# Patient Record
Sex: FEMALE | Race: WHITE | Employment: FULL TIME | URBAN - METROPOLITAN AREA
[De-identification: names, ages, dates, MRNs, and addresses within clinical notes are randomized per-mention and may not be internally consistent; named-entity substitution may affect disease eponyms.]

---

## 2018-01-31 PROBLEM — E66.813 OBESITY, CLASS III, BMI 40-49.9 (MORBID OBESITY): Status: ACTIVE | Noted: 2018-01-31

## 2024-04-22 RX ORDER — PROGESTERONE 200 MG/1
CAPSULE ORAL
Qty: 90 CAPSULE | Refills: 3 | OUTPATIENT
Start: 2024-04-22

## 2024-06-11 ENCOUNTER — OFFICE VISIT (OUTPATIENT)
Dept: GYNECOLOGY | Age: 49
End: 2024-06-11
Payer: COMMERCIAL

## 2024-06-11 VITALS
WEIGHT: 265.88 LBS | OXYGEN SATURATION: 96 % | RESPIRATION RATE: 17 BRPM | BODY MASS INDEX: 45.39 KG/M2 | HEART RATE: 107 BPM | HEIGHT: 64 IN | DIASTOLIC BLOOD PRESSURE: 84 MMHG | SYSTOLIC BLOOD PRESSURE: 140 MMHG

## 2024-06-11 DIAGNOSIS — Z98.84 HX OF BARIATRIC SURGERY: ICD-10-CM

## 2024-06-11 DIAGNOSIS — F33.0 MAJOR DEPRESSIVE DISORDER, RECURRENT, MILD (HCC): ICD-10-CM

## 2024-06-11 DIAGNOSIS — F33.1 MAJOR DEPRESSIVE DISORDER, RECURRENT, MODERATE (HCC): ICD-10-CM

## 2024-06-11 DIAGNOSIS — Z01.419 WELL WOMAN EXAM WITH ROUTINE GYNECOLOGICAL EXAM: Primary | ICD-10-CM

## 2024-06-11 PROCEDURE — 99396 PREV VISIT EST AGE 40-64: CPT | Performed by: OBSTETRICS & GYNECOLOGY

## 2024-06-11 RX ORDER — PROGESTERONE 200 MG/1
200 CAPSULE ORAL DAILY
Qty: 90 CAPSULE | Refills: 3 | Status: SHIPPED | OUTPATIENT
Start: 2024-06-11

## 2024-06-12 ENCOUNTER — TELEPHONE (OUTPATIENT)
Age: 49
End: 2024-06-12

## 2024-06-12 DIAGNOSIS — R90.89 ABNORMAL MRI, SPINAL CORD: Primary | ICD-10-CM

## 2024-06-17 ASSESSMENT — ENCOUNTER SYMPTOMS
RESPIRATORY NEGATIVE: 1
GASTROINTESTINAL NEGATIVE: 1
ALLERGIC/IMMUNOLOGIC NEGATIVE: 1
EYES NEGATIVE: 1

## 2024-06-18 ENCOUNTER — HOSPITAL ENCOUNTER (OUTPATIENT)
Dept: MRI IMAGING | Age: 49
Discharge: HOME | End: 2024-06-18
Attending: PSYCHIATRY & NEUROLOGY
Payer: COMMERCIAL

## 2024-06-18 DIAGNOSIS — G37.9 DEMYELINATING DISEASE (HCC): ICD-10-CM

## 2024-06-18 PROCEDURE — 70551 MRI BRAIN STEM W/O DYE: CPT

## 2024-06-18 NOTE — PROGRESS NOTES
Subjective   Patient ID: Gómez Andrews is a 48 y.o. female.    Patient is here for annual. Patient in perimenopause.     Gynecologic Exam        Review of Systems   Constitutional: Negative.    HENT: Negative.     Eyes: Negative.    Respiratory: Negative.     Cardiovascular: Negative.    Gastrointestinal: Negative.    Endocrine: Negative.    Genitourinary: Negative.    Musculoskeletal: Negative.    Skin: Negative.    Allergic/Immunologic: Negative.    Neurological: Negative.    Hematological: Negative.    Psychiatric/Behavioral: Negative.       Date of Birth 1975  Past Medical History:   Diagnosis Date    Anesthesia     felt like she was unable to breathe after surgery at age 16- please keep O2 on in recovery     Arthritis     Bicuspid cardiac valve     slight leak- OK per echocardiogram     Depression     Dysplasia of cervix     Fatty liver 2024    Gastroesophageal reflux disease 2018    Hearing aid worn     HPV (human papilloma virus) anogenital infection     HTN (hypertension)     Hyperlipidemia     Obesity, Class III, BMI 40-49.9 (morbid obesity) (HCC) 2018    LM (obstructive sleep apnea) 2018    cpap    Type 2 diabetes mellitus 2024     Past Surgical History:   Procedure Laterality Date    BREAST BIOPSY Left x3    benign lumpectomies      SECTION      CHOLECYSTECTOMY      DILATION AND CURETTAGE OF UTERUS      ENDOMETRIAL ABLATION      HERNIA REPAIR N/A 2021    ROBOTIC RECURRENT INCARCERATED INCISIONAL HERNIA REPAIR, WITH MESH, ROBOTIC LYSIS OF ADHESIONS performed by Gavino Rojas DO at [facility] OR    HIATAL HERNIA REPAIR N/A 2020    LAPAROSCOPIC INCARCERATED INCISIONAL HERNIA REPAIR performed by Gavino Rojas DO at [facility] OR    HYSTERECTOMY, TOTAL ABDOMINAL (CERVIX REMOVED) N/A 2017    still has ovaries per patient    LEEP  , 3/2017    SINUS SURGERY      SLEEVE GASTRECTOMY N/A 2020    ROBOTIC SLEEVE GASTRECTOMY performed by Gavino Rojas DO at [facility]

## 2024-06-19 ENCOUNTER — PATIENT MESSAGE (OUTPATIENT)
Dept: NEUROLOGY | Age: 49
End: 2024-06-19

## 2024-06-19 NOTE — TELEPHONE ENCOUNTER
From: Gómez Andrews  To: Dr. Shiva Kovacs  Sent: 6/19/2024 7:24 AM EDT  Subject: Mri    I had MRI yesterday. Of course results not in yet. But I am feeling horrible. I have started FMLA process. I took off yesterday feeling horrible, and today is no better. I was going to wait to ask until results but I don't think I can. It's like since heat hit I have zero energy. Can you please sign for me to go on leave.

## 2024-06-20 ENCOUNTER — PATIENT MESSAGE (OUTPATIENT)
Dept: FAMILY MEDICINE CLINIC | Age: 49
End: 2024-06-20

## 2024-06-20 ENCOUNTER — HOSPITAL ENCOUNTER (OUTPATIENT)
Dept: GENERAL RADIOLOGY | Age: 49
Discharge: HOME | End: 2024-06-20
Payer: COMMERCIAL

## 2024-06-20 ENCOUNTER — OFFICE VISIT (OUTPATIENT)
Dept: FAMILY MEDICINE CLINIC | Age: 49
End: 2024-06-20
Payer: COMMERCIAL

## 2024-06-20 ENCOUNTER — HOSPITAL ENCOUNTER (OUTPATIENT)
Age: 49
Discharge: HOME | End: 2024-06-20
Payer: COMMERCIAL

## 2024-06-20 VITALS
TEMPERATURE: 98.1 F | DIASTOLIC BLOOD PRESSURE: 84 MMHG | HEART RATE: 92 BPM | WEIGHT: 266.2 LBS | BODY MASS INDEX: 45.45 KG/M2 | OXYGEN SATURATION: 96 % | HEIGHT: 64 IN | SYSTOLIC BLOOD PRESSURE: 136 MMHG

## 2024-06-20 DIAGNOSIS — R53.83 FATIGUE, UNSPECIFIED TYPE: ICD-10-CM

## 2024-06-20 DIAGNOSIS — R06.02 SHORTNESS OF BREATH: ICD-10-CM

## 2024-06-20 DIAGNOSIS — Z87.891 PERSONAL HISTORY OF SMOKING: ICD-10-CM

## 2024-06-20 DIAGNOSIS — R06.02 SHORTNESS OF BREATH: Primary | ICD-10-CM

## 2024-06-20 DIAGNOSIS — H92.01 RIGHT EAR PAIN: Primary | ICD-10-CM

## 2024-06-20 PROCEDURE — 71046 X-RAY EXAM CHEST 2 VIEWS: CPT

## 2024-06-20 PROCEDURE — 99214 OFFICE O/P EST MOD 30 MIN: CPT | Performed by: NURSE PRACTITIONER

## 2024-06-20 RX ORDER — FLUTICASONE PROPIONATE 50 MCG
1 SPRAY, SUSPENSION (ML) NASAL DAILY
Qty: 48 G | Refills: 0 | Status: SHIPPED | OUTPATIENT
Start: 2024-06-20

## 2024-06-20 RX ORDER — FLUTICASONE PROPIONATE 50 MCG
1 SPRAY, SUSPENSION (ML) NASAL DAILY
Qty: 32 G | Refills: 1 | Status: SHIPPED | OUTPATIENT
Start: 2024-06-20 | End: 2024-06-20

## 2024-06-20 ASSESSMENT — ENCOUNTER SYMPTOMS
RESPIRATORY NEGATIVE: 1
SHORTNESS OF BREATH: 0
COUGH: 0
GASTROINTESTINAL NEGATIVE: 1
WHEEZING: 0

## 2024-06-20 NOTE — TELEPHONE ENCOUNTER
From: SUMIT ALFARO  To: Gómez Sewell  Sent: 6/20/2024 2:12 PM EDT  Subject: Results      Chest xray is normal  Written by IVETH Sequeira CNP

## 2024-06-20 NOTE — PROGRESS NOTES
Patient: Gómez Sewell is a 48 y.o. female who presents today with the following Chief Complaint(s):  Chief Complaint   Patient presents with    Otalgia     Right ear pain- started Wednesday     Shortness of Breath       Assessment:  Encounter Diagnoses   Name Primary?    Right ear pain Yes    Shortness of breath     Fatigue, unspecified type        Plan:  1. Right ear pain  Likely eustachian tube dysfunction, treat with flonase and follow up if no improvement.     2. Shortness of breath  Obtain chest Xray and follow up if no improvement. Advised patient to use her inhaler as needed and to cut back on smoking.   - XR CHEST STANDARD (2 VW); Future    3. Fatigue, unspecified type  Likely related to possible MS diagnosis. Will fill out intermittent FMLA.       HPI  Patient presents today with concerns of right ear pain. It started yesterday. She states the shortness of breath started today. She is reporting headaches as well.   She is being worked up for possible MS. She is following with neurology. She had a brain MRI on Tuesday of this week and is waiting to hear back from neurology on results.   She has been feeling very fatigued and low energy recently along with the numbness increasing in her feet.   She is requesting intermittent FMLA leave due to her ongoing concerns. After she has MRI results she will follow up with neurology to determine if further FMLA is needed.she will need intermittent for appointments and flare ups.       Current Outpatient Medications   Medication Sig Dispense Refill    fluticasone (FLONASE) 50 MCG/ACT nasal spray 1 spray by Each Nostril route daily 32 g 1    progesterone (PROMETRIUM) 200 MG CAPS capsule Take 1 capsule by mouth daily 90 capsule 3    traZODone (DESYREL) 50 MG tablet Take 1 tablet by mouth nightly 90 tablet 1    Semaglutide,0.25 or 0.5MG/DOS, (OZEMPIC, 0.25 OR 0.5 MG/DOSE,) 2 MG/3ML SOPN Inject 0.5 mg into the skin once a week 3 mL 1    hydroCHLOROthiazide 12.5 MG

## 2024-06-24 NOTE — TELEPHONE ENCOUNTER
MRI resulted.  Routing to provider for next steps. Mentioned symptoms in pt's mychart messages in my routing comments to provider.     Pt does not have f/u sched at this time.

## 2024-06-24 NOTE — TELEPHONE ENCOUNTER
Per Dr. Aguiar - pt shouldn't need another MRI until end of Sept & she was advised of this via [employer].  Pt stated she would like to move forward with Lumbar puncture.  Routing to Dr. Aguiar to enter orders.

## 2024-06-25 NOTE — TELEPHONE ENCOUNTER
Pt informed via [employer] message that Dr. Aguiar has entered orders for LP.  I sent her prep info in that message as well.  Will watch to see when she gets it scheduled.

## 2024-06-26 ENCOUNTER — TELEPHONE (OUTPATIENT)
Dept: INTERVENTIONAL RADIOLOGY/VASCULAR | Age: 49
End: 2024-06-26

## 2024-06-26 NOTE — TELEPHONE ENCOUNTER
Called and spoke to Gómez about upcoming procedure. Phone number used: [phone]  Procedure:  lumbar puncture  Approving Radiologist:     Pt informed of the following:  Date of procedure: 7/1/24  Arrival time of procedure: 1030  Time of procedure: 1200      Need SDS: Yes

## 2024-07-01 ENCOUNTER — HOSPITAL ENCOUNTER (OUTPATIENT)
Dept: INTERVENTIONAL RADIOLOGY/VASCULAR | Age: 49
Discharge: HOME | End: 2024-07-01
Attending: PSYCHIATRY & NEUROLOGY
Payer: COMMERCIAL

## 2024-07-01 VITALS
HEART RATE: 71 BPM | RESPIRATION RATE: 16 BRPM | TEMPERATURE: 98 F | OXYGEN SATURATION: 98 % | SYSTOLIC BLOOD PRESSURE: 130 MMHG | DIASTOLIC BLOOD PRESSURE: 67 MMHG

## 2024-07-01 DIAGNOSIS — R90.89 ABNORMAL MRI, SPINAL CORD: ICD-10-CM

## 2024-07-01 LAB
APPEARANCE CSF: CLEAR
CLOT EVALUATION CSF: NORMAL
COLOR CSF: COLORLESS
DEPRECATED RDW RBC AUTO: 14 % (ref 12.4–15.4)
GLUCOSE BLD-MCNC: 131 MG/DL (ref 70–99)
GLUCOSE CSF-MCNC: 85 MG/DL (ref 40–80)
HCT VFR BLD AUTO: 42.5 % (ref 36–48)
HGB BLD-MCNC: 14.1 G/DL (ref 12–16)
INR PPP: 0.99 (ref 0.85–1.15)
MANUAL DIF COMMENT CSF-IMP: NORMAL
MCH RBC QN AUTO: 32.1 PG (ref 26–34)
MCHC RBC AUTO-ENTMCNC: 33.2 G/DL (ref 31–36)
MCV RBC AUTO: 96.8 FL (ref 80–100)
MENING+ENC PNL CSF NAA+NON-PROBE: NORMAL
NUC CELL # FLD MANUAL: 1 /CUMM (ref 0–5)
PERFORMED ON: ABNORMAL
PLATELET # BLD AUTO: 251 K/UL (ref 135–450)
PMV BLD AUTO: 7.3 FL (ref 5–10.5)
PROT CSF-MCNC: 39 MG/DL (ref 15–45)
PROTHROMBIN TIME: 13.3 SEC (ref 11.9–14.9)
RBC # BLD AUTO: 4.39 M/UL (ref 4–5.2)
RBC # FLD MANUAL: 0 /CUMM
REPORT: NORMAL
TUBE # CSF: NORMAL
WBC # BLD AUTO: 13.9 K/UL (ref 4–11)

## 2024-07-01 PROCEDURE — 2500000003 HC RX 250 WO HCPCS: Performed by: RADIOLOGY

## 2024-07-01 PROCEDURE — 82164 ANGIOTENSIN I ENZYME TEST: CPT

## 2024-07-01 PROCEDURE — 7100000010 HC PHASE II RECOVERY - FIRST 15 MIN: Performed by: RADIOLOGY

## 2024-07-01 PROCEDURE — 85610 PROTHROMBIN TIME: CPT

## 2024-07-01 PROCEDURE — 7100000011 HC PHASE II RECOVERY - ADDTL 15 MIN: Performed by: RADIOLOGY

## 2024-07-01 PROCEDURE — 62328 DX LMBR SPI PNXR W/FLUOR/CT: CPT

## 2024-07-01 PROCEDURE — 85027 COMPLETE CBC AUTOMATED: CPT

## 2024-07-01 PROCEDURE — 89050 BODY FLUID CELL COUNT: CPT

## 2024-07-01 PROCEDURE — 82945 GLUCOSE OTHER FLUID: CPT

## 2024-07-01 PROCEDURE — 87483 CNS DNA AMP PROBE TYPE 12-25: CPT

## 2024-07-01 PROCEDURE — 36415 COLL VENOUS BLD VENIPUNCTURE: CPT

## 2024-07-01 PROCEDURE — 84157 ASSAY OF PROTEIN OTHER: CPT

## 2024-07-01 RX ORDER — LIDOCAINE HYDROCHLORIDE 10 MG/ML
INJECTION, SOLUTION EPIDURAL; INFILTRATION; INTRACAUDAL; PERINEURAL PRN
Status: COMPLETED | OUTPATIENT
Start: 2024-07-01 | End: 2024-07-01

## 2024-07-01 RX ADMIN — LIDOCAINE HYDROCHLORIDE 3 ML: 10 INJECTION, SOLUTION EPIDURAL; INFILTRATION; INTRACAUDAL; PERINEURAL at 12:23

## 2024-07-01 ASSESSMENT — PAIN - FUNCTIONAL ASSESSMENT: PAIN_FUNCTIONAL_ASSESSMENT: NONE - DENIES PAIN

## 2024-07-01 ASSESSMENT — PAIN SCALES - GENERAL: PAINLEVEL_OUTOF10: 6

## 2024-07-01 NOTE — OR NURSING
Pt arrived for image guided lumbar puncture by Lara. Procedure explained including the risk and benefits of the procedure. All questions answered. Pt verbalizes understanding of the procedure and states no more questions. Consent confirmed. Vital signs stable. Labs, allergies, medications, and code status reviewed. No contraindications noted. Patient was placed prone on the IR table. Time out completed prior to procedure start.     Vital Signs  Vitals:    07/01/24 1213   BP: 126/62   Pulse: 73   Resp: 16   Temp:    SpO2: 100%    (vital signs in table format)      Allergies  Biaxin [clarithromycin], Diflucan [fluconazole], Dilaudid [hydromorphone hcl], Erythromycin, Eszopiclone, Hydromorphone, Terbinafine and related, Jardiance [empagliflozin], and Metformin and related (allergies)    Labs  Lab Results   Component Value Date    INR 0.99 07/01/2024    PROTIME 13.3 07/01/2024     Lab Results   Component Value Date    CREATININE 0.6 03/11/2024    BUN 14 03/11/2024     (L) 03/11/2024    K 4.0 03/11/2024    CL 99 03/11/2024    CO2 26 03/11/2024     Lab Results   Component Value Date    WBC 13.9 (H) 07/01/2024    HGB 14.1 07/01/2024    HCT 42.5 07/01/2024    MCV 96.8 07/01/2024     07/01/2024

## 2024-07-01 NOTE — OR NURSING
Image guided lumbar puncture completed. Specimen sent to lab for culture. Pt tolerated procedure without any signs or symptoms of distress. Vital signs stable. Patient to lat flat with HOB raised 15 degrees and bedrest for 2 hours. Patient has a bandage to lower back that is clean, dry and intact. Report called to RN . Pt transported back to [facility] in stable condition via bed by transport.     14.5ml of CSF fluid collect.  Color of CSF fluid: clear    Vital Signs  Vitals:    07/01/24 1213   BP: 126/62   Pulse: 73   Resp: 16   Temp:    SpO2: 100%    (vital signs in table format)

## 2024-07-01 NOTE — PROGRESS NOTES
Pt up to the bathroom to void without difficulty. Patient has met discharge criteria per policy. Discharge instructions given to pt and family. Verbalized understanding. PIV removed. Pt dressed and wheeled out and discharged to the care of their family in stable condition.

## 2024-07-01 NOTE — PROGRESS NOTES
Patient admitted to [facility] bay 7. Consents verified. Patient NPO since 0825 this AM. Patient belongings to remain on PACU cart for procedure.

## 2024-07-03 LAB — ACE CSF-CCNC: 0.6 U/L (ref 0–2.5)

## 2024-07-05 ENCOUNTER — OFFICE VISIT (OUTPATIENT)
Dept: FAMILY MEDICINE CLINIC | Age: 49
End: 2024-07-05
Payer: COMMERCIAL

## 2024-07-05 VITALS
OXYGEN SATURATION: 97 % | BODY MASS INDEX: 45.21 KG/M2 | DIASTOLIC BLOOD PRESSURE: 66 MMHG | SYSTOLIC BLOOD PRESSURE: 134 MMHG | WEIGHT: 264.8 LBS | HEART RATE: 95 BPM | HEIGHT: 64 IN

## 2024-07-05 DIAGNOSIS — G62.9 NEUROPATHY: ICD-10-CM

## 2024-07-05 DIAGNOSIS — R35.0 URINE FREQUENCY: Primary | ICD-10-CM

## 2024-07-05 LAB
BILIRUBIN, POC: NORMAL
BLOOD URINE, POC: NORMAL
CLARITY, POC: NORMAL
COLOR, POC: YELLOW
GLUCOSE URINE, POC: NORMAL
KETONES, POC: NORMAL
LEUKOCYTE EST, POC: NORMAL
NITRITE, POC: NORMAL
PH, POC: 6
PROTEIN, POC: 30
SPECIFIC GRAVITY, POC: 1.03
UROBILINOGEN, POC: 0.2

## 2024-07-05 PROCEDURE — 81002 URINALYSIS NONAUTO W/O SCOPE: CPT | Performed by: NURSE PRACTITIONER

## 2024-07-05 PROCEDURE — 99214 OFFICE O/P EST MOD 30 MIN: CPT | Performed by: NURSE PRACTITIONER

## 2024-07-05 RX ORDER — DULOXETIN HYDROCHLORIDE 30 MG/1
30 CAPSULE, DELAYED RELEASE ORAL 2 TIMES DAILY
Qty: 60 CAPSULE | Refills: 3 | Status: SHIPPED | OUTPATIENT
Start: 2024-07-05

## 2024-07-05 RX ORDER — GABAPENTIN 100 MG/1
100 CAPSULE ORAL NIGHTLY
Qty: 30 CAPSULE | Refills: 1 | Status: SHIPPED | OUTPATIENT
Start: 2024-07-05 | End: 2024-09-03

## 2024-07-05 RX ORDER — SULFAMETHOXAZOLE AND TRIMETHOPRIM 800; 160 MG/1; MG/1
1 TABLET ORAL 2 TIMES DAILY
Qty: 10 TABLET | Refills: 0 | Status: SHIPPED | OUTPATIENT
Start: 2024-07-05 | End: 2024-07-10

## 2024-07-05 ASSESSMENT — ENCOUNTER SYMPTOMS
RESPIRATORY NEGATIVE: 1
GASTROINTESTINAL NEGATIVE: 1

## 2024-07-05 NOTE — PROGRESS NOTES
Patient: Gómez Sewell is a 48 y.o. female who presents today with the following Chief Complaint(s):  Chief Complaint   Patient presents with    Muscle Pain    Urinary Frequency     X 2 days       Assessment:  Encounter Diagnoses   Name Primary?    Urine frequency Yes    Neuropathy        Plan:  1. Urine frequency  Urine dip positive for trace blood.  Will start on Bactrim and send urine culture.  - POCT Urinalysis no Micro  - Culture, Urine  - sulfamethoxazole-trimethoprim (BACTRIM DS;SEPTRA DS) 800-160 MG per tablet; Take 1 tablet by mouth 2 times daily for 5 days  Dispense: 10 tablet; Refill: 0    2. Neuropathy  Will trial gabapentin nightly and patient will let me know if no improvement.  - gabapentin (NEURONTIN) 100 MG capsule; Take 1 capsule by mouth nightly for 60 days.  Dispense: 30 capsule; Refill: 1      HPI  Patient presents today with concerns of UTI.  She reports urinary frequency, muscle aches, increased fatigue and some urgency.  Urine dip today does show trace blood.  Patient states this feels like her past more recent UTIs.  She is also still having tingling burning sensation in her feet and she is concerned that she has not been able to find the reason or cause for this.  Her EMG was negative for any neuropathy however her symptoms seem consistent with neuropathy.  She has not tried gabapentin in the past but is willing to try it and see if it helps.  She is also going to follow-up with the back specialist.        Current Outpatient Medications   Medication Sig Dispense Refill    gabapentin (NEURONTIN) 100 MG capsule Take 1 capsule by mouth nightly for 60 days. 30 capsule 1    sulfamethoxazole-trimethoprim (BACTRIM DS;SEPTRA DS) 800-160 MG per tablet Take 1 tablet by mouth 2 times daily for 5 days 10 tablet 0    DULoxetine (CYMBALTA) 30 MG extended release capsule Take 1 capsule by mouth 2 times daily 60 capsule 3    fluticasone (FLONASE) 50 MCG/ACT nasal spray USE 1 SPRAY IN EACH NOSTRIL DAILY

## 2024-07-05 NOTE — TELEPHONE ENCOUNTER
Per Dr. Aguiar: We have not seen any significant abnormalities to confirm any neurological problem.  I will suggest her to F/U in UC for the second opinion.    [employer] message sent to pt with Dr. Aguiar's recommendation. However, I'm not sure  Neuro is currently accepting patients from non-UC referring providers.  Pt has UMR so she could possibly seek 2nd opinion through [facility] if she's interested in going that route.

## 2024-07-05 NOTE — TELEPHONE ENCOUNTER
Oligoclonal banding is still pending but her MRI brain is normal.  This is likely to be negative.

## 2024-07-07 LAB — BACTERIA UR CULT: NORMAL

## 2024-07-08 ENCOUNTER — OFFICE VISIT (OUTPATIENT)
Dept: ORTHOPEDIC SURGERY | Age: 49
End: 2024-07-08
Payer: COMMERCIAL

## 2024-07-08 VITALS — HEIGHT: 64 IN | WEIGHT: 264 LBS | BODY MASS INDEX: 45.07 KG/M2

## 2024-07-08 DIAGNOSIS — G89.29 CHRONIC RIGHT-SIDED THORACIC BACK PAIN: ICD-10-CM

## 2024-07-08 DIAGNOSIS — R20.2 NUMBNESS AND TINGLING OF BOTH FEET: Primary | ICD-10-CM

## 2024-07-08 DIAGNOSIS — M54.6 CHRONIC RIGHT-SIDED THORACIC BACK PAIN: ICD-10-CM

## 2024-07-08 DIAGNOSIS — M51.34 DDD (DEGENERATIVE DISC DISEASE), THORACIC: ICD-10-CM

## 2024-07-08 DIAGNOSIS — R20.0 NUMBNESS AND TINGLING OF BOTH FEET: Primary | ICD-10-CM

## 2024-07-08 DIAGNOSIS — M50.20 PROTRUSION OF CERVICAL INTERVERTEBRAL DISC: ICD-10-CM

## 2024-07-08 PROCEDURE — 99214 OFFICE O/P EST MOD 30 MIN: CPT | Performed by: PHYSICIAN ASSISTANT

## 2024-07-08 NOTE — PROGRESS NOTES
FOLLOW UP: SPINE    7/8/2024     CHIEF COMPLAINT:    Chief Complaint   Patient presents with    Follow-up     MRI RESULTS CERVICAL & THORACIC     NEUROLOGY CONSULT WITH DR. AGUIAR 6/12/2024       HISTORY OF PRESENT ILLNESS:              The patient is a 48 y.o. female history of GERD, LM, here to review spine MRIs for chronic back pain with LE paresthesias.  Today she states her right thoracic back pain is most bothersome. She reports a 1 year history of left > right foot numbness affecting all digits and now extending into the calves in a stocking distribution.  She also reports underlying chronic low back pain which is typically aggravated with prolonged standing.  Spine MRIs were ordered to assess for cord abnormality.  Prior EMG was normal.  Conservative care includes: Tylenol, prednisone, Cymbalta, physical therapy (improved NP 90%).  At times she does report some subjective foot weakness which is episodic.  She denies any progressive extremity weakness.  She denies any fine motor difficulty.  At time she does report some gait instability.  Currently denies any clear-cut upper or lower extremity radiating symptoms.  She denies any recent injury or direct trauma.  She denies current fevers chills or night sweats.    She does report a history of chronic headaches and previous meningitis    While I was out she did discuss cervical MRI results with Dr. Sosa--there was a questionable cord lesion at C3-4; however, reread favored artifact.  She is actively seeing Dr. Aguiar (neurology) and will be undergoing a repeat C MRI in September. LP was normal.     PCP recently started her on gabapentin 100 mg at nighttime with good benefit and no significant side effects    The pain assessment was noted & reviewed in the medical record today.     Current/Past Treatment:   Physical Therapy: YES for C spine   Chiropractic:     Injection:     Medications:            NSAIDS: History of gastric sleeve             Muscle

## 2024-07-16 RX ORDER — SEMAGLUTIDE 0.68 MG/ML
INJECTION, SOLUTION SUBCUTANEOUS
Qty: 6 ML | Refills: 3 | Status: SHIPPED | OUTPATIENT
Start: 2024-07-16

## 2024-07-16 NOTE — TELEPHONE ENCOUNTER
Last Office Visit  -  7/5/24  Next Office Visit  -  8/2/24    Last Filled  -  4/18/24  Last UDS -    Contract -

## 2024-07-17 ENCOUNTER — HOSPITAL ENCOUNTER (OUTPATIENT)
Dept: PHYSICAL THERAPY | Age: 49
Setting detail: THERAPIES SERIES
Discharge: HOME | End: 2024-07-17
Payer: COMMERCIAL

## 2024-07-17 DIAGNOSIS — R53.1 GENERALIZED WEAKNESS: ICD-10-CM

## 2024-07-17 DIAGNOSIS — M54.50 LUMBAR PAIN: Primary | ICD-10-CM

## 2024-07-17 PROCEDURE — 97110 THERAPEUTIC EXERCISES: CPT

## 2024-07-17 PROCEDURE — 97161 PT EVAL LOW COMPLEX 20 MIN: CPT

## 2024-07-17 NOTE — PLAN OF CARE
activities related to strengthening, flexibility, endurance, ROM performed to prevent loss of range of motion, maintain or improve muscular strength or increase flexibility, following either an injury or surgery.   [x] (94599) NEUROMUSCULAR RE-EDUCATION - Therapeutic procedure, 1 or more areas, each 15 minutes; neuromuscular reeducation of movement, balance, coordination, kinesthetic sense, posture, and/or proprioception for sitting and/or standing activities  [x] (96446) MANUAL THERAPY -  Manual therapy techniques, 1 or more regions, each 15 minutes (Mobilization/manipulation, manual lymphatic drainage, manual traction) for the purpose of modulating pain, promoting relaxation,  increasing ROM, reducing/eliminating soft tissue swelling/inflammation/restriction, improving soft tissue extensibility and allowing for proper ROM for normal function with self care, mobility, lifting and ambulation  [] (79355) Needle insertion(s) without injection; 1 or 2 muscle(s).  [] (20846) Needle insertion(s) without injection; 3 or more muscle(s)  [] (03827) ATTENDED ESTIM. Application of a modality to 1 or more areas; electrical stimulation (manual), each 15 minutes. Attended electrical stimulation requires direct (1-on-1) contact with the patient by the qualified professional/qualified personnel in providing electrical stimulation manually through the use of probes or other devices.        GOALS     Patient stated goal: Decreased symptoms  [] Progressing: [] Met: [] Not Met: [] Adjusted    Therapist goals for Patient:   Short Term Goals: To be achieved in: 2 weeks  1. Independent in HEP and progression per patient tolerance, in order to prevent re-injury.   [] Progressing: [] Met: [] Not Met: [] Adjusted  2. Patient will have a decrease in pain to <1/10 to facilitate improvement in movement, function, and ADLs as indicated by Functional Deficits.  [] Progressing: [] Met: [] Not Met: [] Adjusted    Long Term Goals: To be achieved

## 2024-07-25 ENCOUNTER — HOSPITAL ENCOUNTER (OUTPATIENT)
Dept: PHYSICAL THERAPY | Age: 49
Setting detail: THERAPIES SERIES
End: 2024-07-25
Payer: COMMERCIAL

## 2024-08-02 ENCOUNTER — OFFICE VISIT (OUTPATIENT)
Dept: FAMILY MEDICINE CLINIC | Age: 49
End: 2024-08-02
Payer: COMMERCIAL

## 2024-08-02 VITALS
DIASTOLIC BLOOD PRESSURE: 84 MMHG | BODY MASS INDEX: 45.65 KG/M2 | HEIGHT: 64 IN | WEIGHT: 267.4 LBS | OXYGEN SATURATION: 97 % | HEART RATE: 90 BPM | SYSTOLIC BLOOD PRESSURE: 136 MMHG

## 2024-08-02 DIAGNOSIS — E11.40 TYPE 2 DIABETES MELLITUS WITH DIABETIC NEUROPATHY, WITHOUT LONG-TERM CURRENT USE OF INSULIN (HCC): Primary | ICD-10-CM

## 2024-08-02 DIAGNOSIS — G62.9 NEUROPATHY: ICD-10-CM

## 2024-08-02 LAB — HBA1C MFR BLD: 6.2 %

## 2024-08-02 PROCEDURE — 99214 OFFICE O/P EST MOD 30 MIN: CPT | Performed by: NURSE PRACTITIONER

## 2024-08-02 PROCEDURE — 83036 HEMOGLOBIN GLYCOSYLATED A1C: CPT | Performed by: NURSE PRACTITIONER

## 2024-08-02 PROCEDURE — 3044F HG A1C LEVEL LT 7.0%: CPT | Performed by: NURSE PRACTITIONER

## 2024-08-02 RX ORDER — GABAPENTIN 100 MG/1
100 CAPSULE ORAL 2 TIMES DAILY
Qty: 60 CAPSULE | Refills: 3 | Status: SHIPPED | OUTPATIENT
Start: 2024-08-02 | End: 2024-11-30

## 2024-08-02 ASSESSMENT — ENCOUNTER SYMPTOMS
GASTROINTESTINAL NEGATIVE: 1
RESPIRATORY NEGATIVE: 1

## 2024-08-02 NOTE — PROGRESS NOTES
Patient: Gómez Sewell is a 48 y.o. female who presents today with the following Chief Complaint(s):  Chief Complaint   Patient presents with    3 Month Follow-Up     Discuss increasing Gabapentin        Assessment:  Encounter Diagnoses   Name Primary?    Type 2 diabetes mellitus with diabetic neuropathy, without long-term current use of insulin (HCC) Yes    Neuropathy        Plan:  1. Type 2 diabetes mellitus with diabetic neuropathy, without long-term current use of insulin (HCC)  A1c down to 6.2.  Continue current medications. F/u in 3 months  - POCT glycosylated hemoglobin (Hb A1C)    2. Neuropathy  Doing better with gabapentin we will increase to twice daily and follow-up in 3 months.  - gabapentin (NEURONTIN) 100 MG capsule; Take 1 capsule by mouth in the morning and at bedtime for 120 days.  Dispense: 60 capsule; Refill: 3      HPI  Patient presents today to follow-up on type 2 diabetes and neuropathy.  She reports the gabapentin is helping but would like to try and increase it to twice a day.  She states it does not take the sensation completely away but it does help.  A1c today is down to 6.2.  She is tolerating the Ozempic well.    Current Outpatient Medications   Medication Sig Dispense Refill    gabapentin (NEURONTIN) 100 MG capsule Take 1 capsule by mouth in the morning and at bedtime for 120 days. 60 capsule 3    DULoxetine (CYMBALTA) 30 MG extended release capsule Take 1 capsule by mouth 2 times daily 60 capsule 3    fluticasone (FLONASE) 50 MCG/ACT nasal spray USE 1 SPRAY IN EACH NOSTRIL DAILY 48 g 0    progesterone (PROMETRIUM) 200 MG CAPS capsule Take 1 capsule by mouth daily 90 capsule 3    traZODone (DESYREL) 50 MG tablet Take 1 tablet by mouth nightly 90 tablet 1    Semaglutide,0.25 or 0.5MG/DOS, (OZEMPIC, 0.25 OR 0.5 MG/DOSE,) 2 MG/3ML SOPN Inject 0.5 mg into the skin once a week 3 mL 1    hydroCHLOROthiazide 12.5 MG capsule Take 1 capsule by mouth every morning 90 capsule 1    Blood Pressure

## 2024-08-07 ENCOUNTER — OFFICE VISIT (OUTPATIENT)
Dept: ORTHOPEDIC SURGERY | Age: 49
End: 2024-08-07
Payer: COMMERCIAL

## 2024-08-07 VITALS — BODY MASS INDEX: 45.58 KG/M2 | WEIGHT: 267 LBS | HEIGHT: 64 IN

## 2024-08-07 DIAGNOSIS — M54.42 CHRONIC BILATERAL LOW BACK PAIN WITH LEFT-SIDED SCIATICA: ICD-10-CM

## 2024-08-07 DIAGNOSIS — G89.29 CHRONIC BILATERAL LOW BACK PAIN WITH LEFT-SIDED SCIATICA: ICD-10-CM

## 2024-08-07 DIAGNOSIS — R20.0 NUMBNESS AND TINGLING OF BOTH FEET: Primary | ICD-10-CM

## 2024-08-07 DIAGNOSIS — R20.2 NUMBNESS AND TINGLING OF BOTH FEET: Primary | ICD-10-CM

## 2024-08-07 DIAGNOSIS — M50.20 PROTRUSION OF CERVICAL INTERVERTEBRAL DISC: ICD-10-CM

## 2024-08-07 DIAGNOSIS — M51.34 DDD (DEGENERATIVE DISC DISEASE), THORACIC: ICD-10-CM

## 2024-08-07 PROCEDURE — 99214 OFFICE O/P EST MOD 30 MIN: CPT | Performed by: PHYSICIAN ASSISTANT

## 2024-08-07 NOTE — PROGRESS NOTES
FOLLOW UP: SPINE    8/7/2024     CHIEF COMPLAINT:    Chief Complaint   Patient presents with    Follow-up     CERVICAL, THORACIC, AND LUMBAR SPINE        HISTORY OF PRESENT ILLNESS:              The patient is a 48 y.o. female history of GERD, LM, here to follow-up after physical therapy and pharmacologic care for chronic spine pain with LE paresthesias.  Today she states her cervicothoracic back pain is most bothersome--extends to left scapular region. She also reports a 1 year history of left > right foot numbness affecting all digits and now extending into the calves in a stocking distribution--pending updated EMG.  She also reports underlying chronic low back pain which is typically aggravated with prolonged standing.  Spine MRIs were ordered to assess for cord abnormality--questionable cord lesion C3-4, neurology following and pending updated cervical MRI.  Prior EMG was normal.  Conservative care includes: Physical therapy, gabapentin 100 mg twice daily with PCP, Tylenol, prednisone, Cymbalta.  At times she does report some subjective foot weakness which is episodic.  She reports improvement of her lower extremity symptoms taking gabapentin.  She also feels that PT has been helpful.  She denies any progressive extremity weakness.  She denies any fine motor difficulty.  At time she does report some gait instability.  Currently denies any clear-cut upper extremity radiating symptoms.  She denies any recent injury or direct trauma.  She denies current fevers chills or night sweats.    She does report a history of chronic headaches and previous meningitis    Questionable cord lesion at C3-4; however, reread favored artifact.  She is actively seeing Dr. Aguiar (neurology) and will be undergoing a repeat C MRI in September. LP was normal.     The pain assessment was noted & reviewed in the medical record today.     Current/Past Treatment:   Physical Therapy: YES for spine, helpful   Chiropractic:     Injection:

## 2024-08-08 ENCOUNTER — TELEMEDICINE (OUTPATIENT)
Dept: FAMILY MEDICINE CLINIC | Age: 49
End: 2024-08-08
Payer: COMMERCIAL

## 2024-08-08 DIAGNOSIS — G43.909 MIGRAINE WITHOUT STATUS MIGRAINOSUS, NOT INTRACTABLE, UNSPECIFIED MIGRAINE TYPE: Primary | ICD-10-CM

## 2024-08-08 PROCEDURE — 99213 OFFICE O/P EST LOW 20 MIN: CPT | Performed by: NURSE PRACTITIONER

## 2024-08-08 RX ORDER — SUMATRIPTAN 50 MG/1
50 TABLET, FILM COATED ORAL DAILY PRN
Qty: 9 TABLET | Refills: 3 | Status: SHIPPED | OUTPATIENT
Start: 2024-08-08

## 2024-08-08 RX ORDER — SUMATRIPTAN 50 MG/1
50 TABLET, FILM COATED ORAL DAILY PRN
Qty: 9 TABLET | Refills: 3 | Status: SHIPPED | OUTPATIENT
Start: 2024-08-08 | End: 2024-08-08

## 2024-08-08 RX ORDER — METHYLPREDNISOLONE 4 MG/1
TABLET ORAL
Qty: 21 TABLET | Refills: 0 | Status: SHIPPED | OUTPATIENT
Start: 2024-08-08

## 2024-08-08 RX ORDER — METHYLPREDNISOLONE 4 MG/1
TABLET ORAL
Qty: 1 KIT | Refills: 0 | Status: SHIPPED | OUTPATIENT
Start: 2024-08-08

## 2024-08-08 ASSESSMENT — ENCOUNTER SYMPTOMS
COUGH: 0
GASTROINTESTINAL NEGATIVE: 1
SHORTNESS OF BREATH: 0
WHEEZING: 0
RESPIRATORY NEGATIVE: 1

## 2024-08-08 NOTE — PROGRESS NOTES
Gómez Sewell (:  1975) is a Established patient, presenting virtually for evaluation of the following:    Assessment & Plan   Below is the assessment and plan developed based on review of pertinent history, physical exam, labs, studies, and medications.  1. Migraine without status migrainosus, not intractable, unspecified migraine type  Advised patient to treat with imitrex and if not effective then can try the steroid pack. Follow up if no improvement.   -     methylPREDNISolone (MEDROL, HOLDEN,) 4 MG tablet; Take 6 tablets all at once on day 1, 5 tablets on day 2, 4 tablets on day 3, 3 tablets on day 4 and 2 tablets on day 5 and 1 tablet on day 6., Disp-21 tablet, R-0Normal  -     SUMAtriptan (IMITREX) 50 MG tablet; Take 1 tablet by mouth daily as needed for Migraine May repeat dose in 2 hours if needed. Do not exceed more than 2 doses in 24 hours, Disp-9 tablet, R-3Normal           Subjective   HPI  Patient presents today with concerns of a migraine. She states it is mostly on the left side and her left ear feels like it is ringing. She states she used to have chronic cluster headaches. She states she has not had those in years and this feels a little different. She does report some nausea but denies any vision changes. She has tried Tylenol but is unable to take Ibuprofen due to history of gastric sleeve.   She reports in the past a steroid pack has work well with treating her headaches.       Review of Systems   Constitutional: Negative.    HENT: Negative.     Respiratory: Negative.  Negative for cough, shortness of breath and wheezing.    Cardiovascular: Negative.    Gastrointestinal: Negative.    Musculoskeletal: Negative.    Skin: Negative.    Neurological:  Positive for headaches. Negative for dizziness.   Psychiatric/Behavioral: Negative.            Objective   Patient-Reported Vitals  Patient-Reported Weight: 267 lbs  Patient-Reported Height: 5'3\"       Physical Exam  [INSTRUCTIONS:  \"[x]\"

## 2024-08-12 ENCOUNTER — PATIENT MESSAGE (OUTPATIENT)
Dept: NEUROLOGY | Age: 49
End: 2024-08-12

## 2024-08-12 ENCOUNTER — HOSPITAL ENCOUNTER (EMERGENCY)
Age: 49
Discharge: HOME | End: 2024-08-12
Payer: COMMERCIAL

## 2024-08-12 VITALS
HEIGHT: 63 IN | SYSTOLIC BLOOD PRESSURE: 148 MMHG | OXYGEN SATURATION: 98 % | RESPIRATION RATE: 16 BRPM | TEMPERATURE: 99.1 F | BODY MASS INDEX: 47.31 KG/M2 | WEIGHT: 267 LBS | HEART RATE: 82 BPM | DIASTOLIC BLOOD PRESSURE: 81 MMHG

## 2024-08-12 DIAGNOSIS — G43.809 OTHER MIGRAINE WITHOUT STATUS MIGRAINOSUS, NOT INTRACTABLE: Primary | ICD-10-CM

## 2024-08-12 PROCEDURE — 96374 THER/PROPH/DIAG INJ IV PUSH: CPT

## 2024-08-12 PROCEDURE — 2580000003 HC RX 258: Performed by: NURSE PRACTITIONER

## 2024-08-12 PROCEDURE — 6360000002 HC RX W HCPCS: Performed by: NURSE PRACTITIONER

## 2024-08-12 PROCEDURE — 6370000000 HC RX 637 (ALT 250 FOR IP): Performed by: NURSE PRACTITIONER

## 2024-08-12 PROCEDURE — 96375 TX/PRO/DX INJ NEW DRUG ADDON: CPT

## 2024-08-12 PROCEDURE — 99284 EMERGENCY DEPT VISIT MOD MDM: CPT

## 2024-08-12 RX ORDER — METOCLOPRAMIDE HYDROCHLORIDE 5 MG/ML
10 INJECTION INTRAMUSCULAR; INTRAVENOUS ONCE
Status: COMPLETED | OUTPATIENT
Start: 2024-08-12 | End: 2024-08-12

## 2024-08-12 RX ORDER — KETOROLAC TROMETHAMINE 30 MG/ML
30 INJECTION, SOLUTION INTRAMUSCULAR; INTRAVENOUS ONCE
Status: COMPLETED | OUTPATIENT
Start: 2024-08-12 | End: 2024-08-12

## 2024-08-12 RX ORDER — DIPHENHYDRAMINE HYDROCHLORIDE 50 MG/ML
25 INJECTION INTRAMUSCULAR; INTRAVENOUS ONCE
Status: COMPLETED | OUTPATIENT
Start: 2024-08-12 | End: 2024-08-12

## 2024-08-12 RX ORDER — 0.9 % SODIUM CHLORIDE 0.9 %
1000 INTRAVENOUS SOLUTION INTRAVENOUS ONCE
Status: COMPLETED | OUTPATIENT
Start: 2024-08-12 | End: 2024-08-12

## 2024-08-12 RX ORDER — BUTALBITAL, ACETAMINOPHEN AND CAFFEINE 50; 325; 40 MG/1; MG/1; MG/1
2 TABLET ORAL ONCE
Status: COMPLETED | OUTPATIENT
Start: 2024-08-12 | End: 2024-08-12

## 2024-08-12 RX ADMIN — SODIUM CHLORIDE 1000 ML: 9 INJECTION, SOLUTION INTRAVENOUS at 09:12

## 2024-08-12 RX ADMIN — METOCLOPRAMIDE HYDROCHLORIDE 10 MG: 5 INJECTION INTRAMUSCULAR; INTRAVENOUS at 09:13

## 2024-08-12 RX ADMIN — DIPHENHYDRAMINE HYDROCHLORIDE 25 MG: 50 INJECTION INTRAMUSCULAR; INTRAVENOUS at 09:13

## 2024-08-12 RX ADMIN — KETOROLAC TROMETHAMINE 30 MG: 30 INJECTION, SOLUTION INTRAMUSCULAR at 09:14

## 2024-08-12 RX ADMIN — BUTALBITAL, ACETAMINOPHEN AND CAFFEINE 2 TABLET: 325; 50; 40 TABLET ORAL at 09:15

## 2024-08-12 ASSESSMENT — PAIN DESCRIPTION - LOCATION
LOCATION: HEAD
LOCATION: HEAD

## 2024-08-12 ASSESSMENT — PAIN - FUNCTIONAL ASSESSMENT: PAIN_FUNCTIONAL_ASSESSMENT: 0-10

## 2024-08-12 ASSESSMENT — PAIN SCALES - GENERAL
PAINLEVEL_OUTOF10: 0
PAINLEVEL_OUTOF10: 5
PAINLEVEL_OUTOF10: 7

## 2024-08-12 NOTE — ED NOTES
Patient reports that her headache pain is a 0/10 and is improved from previous with medications.  Patient repots she would like to go home and \"go to bed\". Patient resting calm in bed. NP made aware.

## 2024-08-13 ENCOUNTER — TELEPHONE (OUTPATIENT)
Dept: ORTHOPEDIC SURGERY | Age: 49
End: 2024-08-13

## 2024-08-13 ENCOUNTER — CARE COORDINATION (OUTPATIENT)
Dept: OTHER | Facility: CLINIC | Age: 49
End: 2024-08-13

## 2024-08-13 ENCOUNTER — PATIENT MESSAGE (OUTPATIENT)
Dept: ORTHOPEDIC SURGERY | Age: 49
End: 2024-08-13

## 2024-08-13 ENCOUNTER — TELEPHONE (OUTPATIENT)
Age: 49
End: 2024-08-13

## 2024-08-13 DIAGNOSIS — R20.2 NUMBNESS AND TINGLING OF BOTH FEET: Primary | ICD-10-CM

## 2024-08-13 DIAGNOSIS — M54.2 NECK PAIN: Primary | ICD-10-CM

## 2024-08-13 DIAGNOSIS — M50.20 PROTRUSION OF CERVICAL INTERVERTEBRAL DISC: ICD-10-CM

## 2024-08-13 DIAGNOSIS — G95.9 MYELOPATHY (HCC): ICD-10-CM

## 2024-08-13 DIAGNOSIS — R20.2 NUMBNESS AND TINGLING OF BOTH FEET: ICD-10-CM

## 2024-08-13 DIAGNOSIS — M54.2 NECK PAIN: ICD-10-CM

## 2024-08-13 DIAGNOSIS — R20.0 NUMBNESS AND TINGLING OF BOTH FEET: Primary | ICD-10-CM

## 2024-08-13 DIAGNOSIS — R20.0 NUMBNESS AND TINGLING OF BOTH FEET: ICD-10-CM

## 2024-08-13 NOTE — TELEPHONE ENCOUNTER
[facility] called regarding patient headaches. Pt states she has tried reach out to office via [employer] multiple times without success. I informed office I would reach out to patient directly to address her concerns. Patient was seen in ED for headache/migraines by end of visit states her pain level was a 0. States by the time she left the ER she was felt better for about 4 hours but medication did wear off.   Patient is currently scheduled for MRI cervical spine on 8/15 ordered by ortho. Dr Aguiar did advise patient in June to seek a 2nd opinion through  or [facility] if she did not agree with plan of care.   Spoke with patient whom scheduled for fua 8/14/24 to discuss her headaches

## 2024-08-13 NOTE — CARE COORDINATION
Ambulatory Care Coordination Note     2024 12:07 PM     Patient Current Location:  [place]     This patient was received as a referral from Population [facility] .    ACM contacted the patient by telephone. Verified name and  with patient as identifiers. Provided introduction to self, and explanation of the ACM role.   Patient accepted care management services at this time.          ACM: Giovanna Lopez RN     Challenges to be reviewed by the provider   Additional needs identified to be addressed with provider No  none               Method of communication with provider: none.    Care Summary Note: Patient states she was in the ED 24. States she has had a headache for 5 days and she could not get any relief from headache at home. States she did improve while at the ED. States around 11:30 her headache started to come back. Denies nausea currently but states she did have nausea for a few days prior to when her headache started. States she is taking tylenol and gets a few hours of relief but then headache comes right back. States she does not take ibuprofen because she has had a gastric sleeve. States she is staying hydrated. Caffeine does seem to help with headache. States Sona back and body seems to help also. States headache seems to be worse when standing up or coughing. Denies n/v/d. States she does have sensitivity to sound. She is working from home.   Patient is established with neurology, orthopedics, PCP. States she has a history of cluster headaches about 25 years ago. States her blood pressure is good and she is taking her blood pressure medication as directed. Patient states she also has numbness and tingling in bilateral feet and this goes up the side of her calves to her knees. States she was prescribed gabapentin and this does seem to be helping. She is also on oral steroids and her last dose is today. Patient states she is diabetic. Last A1C was 6.3. states she is on ozempic and no

## 2024-08-13 NOTE — TELEPHONE ENCOUNTER
Spoke with the patient who states she is having severe headaches at this time. Patient was informed that per Leisa Oquendo, PAC \"Can order cervical MRI W&WO compare to prior. Would also recommend discussing migraine with neurology.\"    Patient states she has been trying to get ahold of neurology, she states they are not returning her messages. I informed the patient the MRI C-Spine order will be placed, will call once approved. She was also informed I will attempt to reach out to Neurology and let them know about her headaches. She voiced understanding. I will reach out to patient once I speak with Neurology. She voiced understanding.

## 2024-08-13 NOTE — ED PROVIDER NOTES
Physical Exam  GENERAL APPEARANCE: Awake and alert. Cooperative. No acute distress.  HEAD: Normocephalic. Atraumatic.  EYES: PERRL. EOM's grossly intact. No scleral injection or icterus.  ENT: Mucous membranes are moist.   NECK: Supple. No tracheal deviation.  No nuchal rigidity  HEART: RRR.   LUNGS: Respirations unlabored. CTAB. Good air exchange. Speaking comfortably in full sentences.   ABDOMEN: Soft. Non-distended. Non-tender. No guarding or rebound. Normal bowel sounds.  EXTREMITIES: No peripheral edema. Moves all extremities equally. All extremities neurovascularly intact.   SKIN: Warm and dry. No acute rashes.   NEUROLOGICAL: Alert and oriented. No gross facial drooping. Strength 5/5, sensation intact. Normal coordination. Gait is steady.  Cranial nerves II through XII are grossly intact  PSYCHIATRIC: Normal mood and affect.      DIAGNOSTIC RESULTS   LABS:    Labs Reviewed - No data to display    When ordered only abnormal lab results are displayed. All other labs were within normal range or not returned as of this dictation.    EKG: When ordered, EKG's are interpreted by the Emergency Department Physician in the absence of a cardiologist.  Please see their note for interpretation of EKG.    RADIOLOGY:   Non-plain film images such as CT, Ultrasound and MRI are read by the radiologist. Plain radiographic images are visualized and preliminarily interpreted by the ED Provider with the below findings:        Interpretation per the Radiologist below, if available at the time of this note:    No orders to display     No results found.    No results found.    PROCEDURES   Unless otherwise noted below, none     Procedures    CRITICAL CARE TIME (.cctime)       PAST MEDICAL HISTORY      has a past medical history of Anesthesia, Arthritis, Bicuspid cardiac valve, Depression, Dysplasia of cervix, Fatty liver (03/11/2024), Gastroesophageal reflux disease (01/07/2018), Hearing aid worn, HPV (human papilloma

## 2024-08-13 NOTE — TELEPHONE ENCOUNTER
Attempted to call pt to discuss options. Can order cervical MRI W&WO compare to prior. Would also recommend discussing migraine with neurology. ED if any new or progressive symptoms. Left VM.     Leisa Oquendo PA-C

## 2024-08-13 NOTE — TELEPHONE ENCOUNTER
It is worth noting that pt sent 4 [facility] messages on Monday 8/13 between the hours of 3:50 a.m. and 8:06 a.m. - office opens at 8:00.  She informed us via [facility] at 8:06 that she was going to the ER.

## 2024-08-14 ENCOUNTER — OFFICE VISIT (OUTPATIENT)
Age: 49
End: 2024-08-14

## 2024-08-14 VITALS
HEART RATE: 87 BPM | OXYGEN SATURATION: 97 % | DIASTOLIC BLOOD PRESSURE: 82 MMHG | BODY MASS INDEX: 47.3 KG/M2 | SYSTOLIC BLOOD PRESSURE: 136 MMHG | HEIGHT: 63 IN | RESPIRATION RATE: 12 BRPM

## 2024-08-14 DIAGNOSIS — M54.81 OCCIPITAL NEURALGIA OF LEFT SIDE: ICD-10-CM

## 2024-08-14 DIAGNOSIS — R51.9 ACUTE INTRACTABLE HEADACHE, UNSPECIFIED HEADACHE TYPE: Primary | ICD-10-CM

## 2024-08-14 RX ORDER — DEXAMETHASONE SODIUM PHOSPHATE 10 MG/ML
10 INJECTION INTRAMUSCULAR; INTRAVENOUS ONCE
Status: COMPLETED | OUTPATIENT
Start: 2024-08-14 | End: 2024-08-14

## 2024-08-14 RX ORDER — LIDOCAINE HYDROCHLORIDE 20 MG/ML
5 INJECTION, SOLUTION INFILTRATION; PERINEURAL ONCE
Status: COMPLETED | OUTPATIENT
Start: 2024-08-14 | End: 2024-08-14

## 2024-08-14 RX ADMIN — LIDOCAINE HYDROCHLORIDE 5 ML: 20 INJECTION, SOLUTION INFILTRATION; PERINEURAL at 16:19

## 2024-08-14 RX ADMIN — DEXAMETHASONE SODIUM PHOSPHATE 10 MG: 10 INJECTION INTRAMUSCULAR; INTRAVENOUS at 16:18

## 2024-08-14 NOTE — PATIENT INSTRUCTIONS

## 2024-08-14 NOTE — PROGRESS NOTES
Occipital nerve block    Indication:    Left occipital neuralgia    Technique:    Identify landmarks and cahyo the injection site as the diagram below  Move hair from area (e.g. with assistant or lubricating jelly can be applied)  Clean the area (e.g. hibiclens, betadine, or Alcohol)    Preparation:    Syringe: 5 cc  Needle: 27 gauge 1.25\"  1% Lidocaine 3 cc  Dexamethasone 10 mg or 1 cc    Injection:    Insert the needle from inferior approach  Angle approximately 30-45 degrees and insert until striking periosteum  Aspirate for blood and if found, withdraw and redirect needle (to prevent intravascular injection)  Inject a total of 2-3 cc of medication at site, distributing in a fan-shaped distribution, NIVIA technique         
cpap    Type 2 diabetes mellitus 2024       Past surgical history:    Past Surgical History:   Procedure Laterality Date    BREAST BIOPSY Left x3    benign lumpectomies      SECTION      CHOLECYSTECTOMY      DILATION AND CURETTAGE OF UTERUS      ENDOMETRIAL ABLATION      HERNIA REPAIR N/A 2021    ROBOTIC RECURRENT INCARCERATED INCISIONAL HERNIA REPAIR, WITH MESH, ROBOTIC LYSIS OF ADHESIONS performed by Gavino Rojas DO at [facility] OR    HIATAL HERNIA REPAIR N/A 2020    LAPAROSCOPIC INCARCERATED INCISIONAL HERNIA REPAIR performed by Gavino Rojas DO at [facility] OR    HYSTERECTOMY, TOTAL ABDOMINAL (CERVIX REMOVED) N/A 2017    still has ovaries per patient    LEEP  , 3/2017    SINUS SURGERY      SLEEVE GASTRECTOMY N/A 2020    ROBOTIC SLEEVE GASTRECTOMY performed by Gavino Rojas DO at [facility] OR    TUBAL LIGATION      TYMPANOSTOMY TUBE PLACEMENT  x2    UPPER GASTROINTESTINAL ENDOSCOPY N/A 2020    EGD BIOPSY performed by Gavnio Rojas DO at [facility] ENDOSCOPY        Medication:    Current Outpatient Medications   Medication Sig Dispense Refill    gabapentin (NEURONTIN) 100 MG capsule Take 1 capsule by mouth in the morning and at bedtime for 120 days. 60 capsule 3    DULoxetine (CYMBALTA) 30 MG extended release capsule Take 1 capsule by mouth 2 times daily 60 capsule 3    progesterone (PROMETRIUM) 200 MG CAPS capsule Take 1 capsule by mouth daily 90 capsule 3    traZODone (DESYREL) 50 MG tablet Take 1 tablet by mouth nightly 90 tablet 1    Semaglutide,0.25 or 0.5MG/DOS, (OZEMPIC, 0.25 OR 0.5 MG/DOSE,) 2 MG/3ML SOPN Inject 0.5 mg into the skin once a week 3 mL 1    hydroCHLOROthiazide 12.5 MG capsule Take 1 capsule by mouth every morning 90 capsule 1    Blood Pressure Monitoring (COMFORT TOUCH BP CUFF/LARGE) MISC To check BP daily 1 each 0    omeprazole (PRILOSEC) 40 MG delayed release capsule Take 1 capsule by mouth daily 90 capsule 3    atorvastatin (LIPITOR) 40 MG tablet Take 1 tablet by mouth

## 2024-08-15 ENCOUNTER — HOSPITAL ENCOUNTER (OUTPATIENT)
Dept: MRI IMAGING | Age: 49
Discharge: HOME | End: 2024-08-15
Payer: COMMERCIAL

## 2024-08-15 DIAGNOSIS — R20.2 NUMBNESS AND TINGLING OF BOTH FEET: ICD-10-CM

## 2024-08-15 DIAGNOSIS — M54.2 NECK PAIN: ICD-10-CM

## 2024-08-15 DIAGNOSIS — R20.0 NUMBNESS AND TINGLING OF BOTH FEET: ICD-10-CM

## 2024-08-15 DIAGNOSIS — M50.20 PROTRUSION OF CERVICAL INTERVERTEBRAL DISC: ICD-10-CM

## 2024-08-15 DIAGNOSIS — G95.9 MYELOPATHY (HCC): ICD-10-CM

## 2024-08-15 LAB
PERFORMED ON: NORMAL
POC CREATININE: 0.6 MG/DL (ref 0.6–1.1)
POC SAMPLE TYPE: NORMAL

## 2024-08-15 PROCEDURE — A9579 GAD-BASE MR CONTRAST NOS,1ML: HCPCS | Performed by: PHYSICIAN ASSISTANT

## 2024-08-15 PROCEDURE — 82565 ASSAY OF CREATININE: CPT

## 2024-08-15 PROCEDURE — 72156 MRI NECK SPINE W/O & W/DYE: CPT

## 2024-08-15 PROCEDURE — 6360000004 HC RX CONTRAST MEDICATION: Performed by: PHYSICIAN ASSISTANT

## 2024-08-15 RX ADMIN — GADOTERIDOL 20 ML: 279.3 INJECTION, SOLUTION INTRAVENOUS at 08:49

## 2024-08-29 ENCOUNTER — CARE COORDINATION (OUTPATIENT)
Dept: OTHER | Facility: CLINIC | Age: 49
End: 2024-08-29

## 2024-08-29 NOTE — CARE COORDINATION
Ambulatory Care Coordination Note     8/29/2024 1:48 PM     Patient outreach attempt by this AC today to perform care management follow up . [facility] was unable to reach the patient by telephone today; left voice message requesting a return phone call to this ACM.     ACM: Giovanna Lopez RN    PCP/Specialist follow up:   Future Appointments         Provider Specialty Dept Phone    8/30/2024 8:00 AM Rach Pritchard APRN - CNP Family Medicine [phone]    11/8/2024 4:20 PM Rach Pritchard APRN - CNP Family Medicine [phone]    6/12/2025 4:40 PM Yamileth Gardner MD Gynecology [phone]            Follow Up:   Plan for next AC outreach in approximately 1 week to complete:  - outreach attempt to offer care management services.

## 2024-08-30 ENCOUNTER — OFFICE VISIT (OUTPATIENT)
Dept: FAMILY MEDICINE CLINIC | Age: 49
End: 2024-08-30
Payer: COMMERCIAL

## 2024-08-30 VITALS
HEART RATE: 91 BPM | WEIGHT: 268 LBS | BODY MASS INDEX: 47.48 KG/M2 | DIASTOLIC BLOOD PRESSURE: 78 MMHG | SYSTOLIC BLOOD PRESSURE: 128 MMHG | HEIGHT: 63 IN | OXYGEN SATURATION: 98 %

## 2024-08-30 DIAGNOSIS — S90.424A BLISTER OF TOE OF RIGHT FOOT, INITIAL ENCOUNTER: Primary | ICD-10-CM

## 2024-08-30 PROCEDURE — 99213 OFFICE O/P EST LOW 20 MIN: CPT | Performed by: NURSE PRACTITIONER

## 2024-08-30 RX ORDER — MUPIROCIN 20 MG/G
OINTMENT TOPICAL
Qty: 15 G | Refills: 1 | Status: SHIPPED | OUTPATIENT
Start: 2024-08-30 | End: 2024-09-06

## 2024-08-30 ASSESSMENT — ENCOUNTER SYMPTOMS
GASTROINTESTINAL NEGATIVE: 1
RESPIRATORY NEGATIVE: 1

## 2024-08-30 NOTE — PROGRESS NOTES
Patient: Gómez Sewell is a 48 y.o. female who presents today with the following Chief Complaint(s):  Chief Complaint   Patient presents with    Toe Pain     Raw spot in between 4th and 5th toe right foot. Her feet are numb, but toe is hurting       Assessment:  Encounter Diagnosis   Name Primary?    Blister of toe of right foot, initial encounter Yes       Plan:  1. Blister of toe of right foot, initial encounter  Treat with bactroban ointment and follow up if no improvement or worsening symptoms.   - mupirocin (BACTROBAN) 2 % ointment; Apply topically 3 times daily.  Dispense: 15 g; Refill: 1      HPI  Patient presents today with concerns of soreness in between her 4th and 5th toe on the right. She states it feels raw. She does have numbness on her feet. She noticed the area 2-3 days ago. She is a diabetic.     Current Outpatient Medications   Medication Sig Dispense Refill    mupirocin (BACTROBAN) 2 % ointment Apply topically 3 times daily. 15 g 1    gabapentin (NEURONTIN) 100 MG capsule Take 1 capsule by mouth in the morning and at bedtime for 120 days. 60 capsule 3    DULoxetine (CYMBALTA) 30 MG extended release capsule Take 1 capsule by mouth 2 times daily 60 capsule 3    progesterone (PROMETRIUM) 200 MG CAPS capsule Take 1 capsule by mouth daily 90 capsule 3    traZODone (DESYREL) 50 MG tablet Take 1 tablet by mouth nightly 90 tablet 1    Semaglutide,0.25 or 0.5MG/DOS, (OZEMPIC, 0.25 OR 0.5 MG/DOSE,) 2 MG/3ML SOPN Inject 0.5 mg into the skin once a week 3 mL 1    hydroCHLOROthiazide 12.5 MG capsule Take 1 capsule by mouth every morning 90 capsule 1    Blood Pressure Monitoring (COMFORT TOUCH BP CUFF/LARGE) MISC To check BP daily 1 each 0    omeprazole (PRILOSEC) 40 MG delayed release capsule Take 1 capsule by mouth daily 90 capsule 3    atorvastatin (LIPITOR) 40 MG tablet Take 1 tablet by mouth daily 90 tablet 3    lisinopril (PRINIVIL;ZESTRIL) 20 MG tablet Take 1 tablet by mouth daily 90 tablet 1    blood

## 2024-09-09 ENCOUNTER — TELEPHONE (OUTPATIENT)
Dept: ORTHOPEDIC SURGERY | Age: 49
End: 2024-09-09

## 2024-09-12 ENCOUNTER — CARE COORDINATION (OUTPATIENT)
Dept: OTHER | Facility: CLINIC | Age: 49
End: 2024-09-12

## 2024-09-30 DIAGNOSIS — R20.2 NUMBNESS AND TINGLING OF BOTH FEET: Primary | ICD-10-CM

## 2024-09-30 DIAGNOSIS — R20.0 NUMBNESS AND TINGLING OF BOTH FEET: Primary | ICD-10-CM

## 2024-10-01 ENCOUNTER — CARE COORDINATION (OUTPATIENT)
Dept: OTHER | Facility: CLINIC | Age: 49
End: 2024-10-01

## 2024-10-01 NOTE — CARE COORDINATION
Ambulatory Care Coordination Note     10/1/2024 2:33 PM     patient outreach attempt by this ACM today to perform care management follow up . ACM was unable to reach the patient by telephone today; left voice message requesting a return phone call to this ACM.  [employer] message sent requesting patient to contact this ACM.     Patient closed (unable to reach patient) from the High Risk Care Management program on 10/1/2024.  Care management goals have been completed. No further Ambulatory Care Manager follow up scheduled.

## 2024-10-07 ENCOUNTER — TELEPHONE (OUTPATIENT)
Dept: ORTHOPEDIC SURGERY | Age: 49
End: 2024-10-07

## 2024-10-07 NOTE — TELEPHONE ENCOUNTER
L/M for the patient informing the patient I was calling to let her know we had a same day cancellation at 2:20PM at our [place] office with Leisa Oquendo PA-C. She may call if wishing to proceed with same day add on or she may keep her appointment on Wednesday 10/9/24 at our [place] office. Patient was instructed to call back at her convenience.

## 2024-10-09 ENCOUNTER — OFFICE VISIT (OUTPATIENT)
Dept: ORTHOPEDIC SURGERY | Age: 49
End: 2024-10-09
Payer: COMMERCIAL

## 2024-10-09 VITALS — WEIGHT: 268 LBS | BODY MASS INDEX: 47.48 KG/M2 | HEIGHT: 63 IN

## 2024-10-09 DIAGNOSIS — R20.2 NUMBNESS AND TINGLING OF BOTH FEET: Primary | ICD-10-CM

## 2024-10-09 DIAGNOSIS — R20.0 NUMBNESS AND TINGLING OF BOTH FEET: Primary | ICD-10-CM

## 2024-10-09 DIAGNOSIS — M54.2 NECK PAIN: ICD-10-CM

## 2024-10-09 DIAGNOSIS — M50.20 PROTRUSION OF CERVICAL INTERVERTEBRAL DISC: ICD-10-CM

## 2024-10-09 PROCEDURE — 99214 OFFICE O/P EST MOD 30 MIN: CPT | Performed by: PHYSICIAN ASSISTANT

## 2024-10-09 NOTE — PROGRESS NOTES
Occipital nerve--Dr. 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    Past Medical History: Medical history form was reviewed today & scanned into the media tab  Past Medical History:   Diagnosis Date    Anesthesia     felt like she was unable to breathe after surgery at age 16- please keep O2 on in recovery     Arthritis     Bicuspid cardiac valve     slight leak- OK per echocardiogram     Depression     Dysplasia of cervix     Fatty liver 2024    Gastroesophageal reflux disease 2018    Hearing aid worn     HPV (human papilloma virus) anogenital infection     HTN (hypertension)     Hyperlipidemia     Obesity, Class III, BMI 40-49.9 (morbid obesity) 2018    LM (obstructive sleep apnea) 2018    cpap    Type 2 diabetes mellitus 2024      Past Surgical History:     Past Surgical History:   Procedure Laterality Date    BREAST BIOPSY Left x3    benign lumpectomies      SECTION      CHOLECYSTECTOMY      DILATION AND CURETTAGE OF UTERUS      ENDOMETRIAL ABLATION      HERNIA REPAIR N/A 2021    ROBOTIC RECURRENT INCARCERATED INCISIONAL HERNIA REPAIR, WITH MESH, ROBOTIC LYSIS OF ADHESIONS performed by Gavino Rojas DO at [facility] OR    HIATAL HERNIA REPAIR N/A 2020    LAPAROSCOPIC INCARCERATED INCISIONAL HERNIA REPAIR performed by Gavino Rojas DO at [facility] OR    HYSTERECTOMY, TOTAL ABDOMINAL (CERVIX REMOVED) N/A 2017    still has ovaries per patient    LEEP  2015, 3/2017    SINUS SURGERY      SLEEVE GASTRECTOMY N/A 2020    ROBOTIC SLEEVE GASTRECTOMY performed by Gavino Rojas DO at [facility] OR    TUBAL LIGATION      TYMPANOSTOMY TUBE PLACEMENT

## 2024-10-15 ENCOUNTER — PATIENT MESSAGE (OUTPATIENT)
Dept: FAMILY MEDICINE CLINIC | Age: 49
End: 2024-10-15

## 2024-10-15 DIAGNOSIS — E11.65 TYPE 2 DIABETES MELLITUS WITH HYPERGLYCEMIA, WITHOUT LONG-TERM CURRENT USE OF INSULIN (HCC): ICD-10-CM

## 2024-10-15 RX ORDER — SEMAGLUTIDE 0.68 MG/ML
0.5 INJECTION, SOLUTION SUBCUTANEOUS WEEKLY
Qty: 3 ML | Refills: 1 | Status: SHIPPED | OUTPATIENT
Start: 2024-10-15

## 2024-10-15 RX ORDER — SEMAGLUTIDE 0.68 MG/ML
0.5 INJECTION, SOLUTION SUBCUTANEOUS WEEKLY
Qty: 3 ML | Refills: 0 | Status: SHIPPED | COMMUNITY
Start: 2024-10-15

## 2024-10-15 RX ORDER — SEMAGLUTIDE 0.68 MG/ML
INJECTION, SOLUTION SUBCUTANEOUS
Qty: 6 ML | Refills: 0 | OUTPATIENT
Start: 2024-10-15

## 2024-10-15 NOTE — TELEPHONE ENCOUNTER
Last Office Visit  -  8/30/24  Next Office Visit  -  11/8/24    Last Filled  -  4/18/24  Last UDS -    Contract -  .    Please advise? Do have samples

## 2024-10-17 RX ORDER — LISINOPRIL 20 MG/1
20 TABLET ORAL DAILY
Qty: 90 TABLET | Refills: 1 | Status: SHIPPED | OUTPATIENT
Start: 2024-10-17

## 2024-10-17 NOTE — TELEPHONE ENCOUNTER
Last Office Visit  -  8/30/24  Next Office Visit  -  10/22/24    Last Filled  -  3/12/24  Last UDS -    Contract -

## 2024-10-18 ENCOUNTER — OFFICE VISIT (OUTPATIENT)
Dept: FAMILY MEDICINE CLINIC | Age: 49
End: 2024-10-18
Payer: COMMERCIAL

## 2024-10-18 VITALS
HEART RATE: 90 BPM | BODY MASS INDEX: 47.2 KG/M2 | OXYGEN SATURATION: 97 % | HEIGHT: 63 IN | DIASTOLIC BLOOD PRESSURE: 82 MMHG | SYSTOLIC BLOOD PRESSURE: 138 MMHG | WEIGHT: 266.4 LBS

## 2024-10-18 DIAGNOSIS — G62.9 NEUROPATHY: ICD-10-CM

## 2024-10-18 DIAGNOSIS — H66.91 RIGHT OTITIS MEDIA, UNSPECIFIED OTITIS MEDIA TYPE: Primary | ICD-10-CM

## 2024-10-18 PROCEDURE — 99213 OFFICE O/P EST LOW 20 MIN: CPT | Performed by: NURSE PRACTITIONER

## 2024-10-18 RX ORDER — GABAPENTIN 100 MG/1
CAPSULE ORAL
Qty: 30 CAPSULE | Refills: 3 | Status: SHIPPED | OUTPATIENT
Start: 2024-10-18 | End: 2024-11-18

## 2024-10-18 RX ORDER — AZITHROMYCIN 250 MG/1
TABLET, FILM COATED ORAL
Qty: 1 PACKET | Refills: 0 | Status: SHIPPED | OUTPATIENT
Start: 2024-10-18

## 2024-10-18 ASSESSMENT — ENCOUNTER SYMPTOMS
SHORTNESS OF BREATH: 0
WHEEZING: 0
GASTROINTESTINAL NEGATIVE: 1
RESPIRATORY NEGATIVE: 1
COUGH: 0

## 2024-10-18 NOTE — PROGRESS NOTES
Patient: Gómez Sewell is a 49 y.o. female who presents today with the following Chief Complaint(s):  Chief Complaint   Patient presents with    Otalgia     Right ear pain x 2 days    Other     Would like to discuss increasing gabapentin        Assessment:  Encounter Diagnoses   Name Primary?    Right otitis media, unspecified otitis media type Yes    Neuropathy        Plan:  1. Right otitis media, unspecified otitis media type  We will treat with Z-Leobardo and follow-up if no improvement.  - azithromycin (ZITHROMAX) 250 MG tablet; Take 2 tablets on day 1 and 1 tablet day 2-5  Dispense: 1 packet; Refill: 0    2. Neuropathy  Will increase gabapentin 1 in the morning 2 a night. Follow up if no improvement.   - gabapentin (NEURONTIN) 100 MG capsule; Take 1  capsule  in the AM  and 2 capsules at night  Dispense: 30 capsule; Refill: 3      HPI  Patient presents today with concerns of right ear pain.  She states her ear feels full and she feels like she is hearing an echo.  Symptoms started about 2 days ago.  She denies any other cough congestion.  She states her left ear feels fine.  She would also like to talk about increasing her gabapentin.  She would like to do 1 tablet in the morning and 2 at night.  She states that increasing the dose slowly has been helping smith off side effects and it is helping with her symptoms.        Current Outpatient Medications   Medication Sig Dispense Refill    gabapentin (NEURONTIN) 100 MG capsule Take 1  capsule  in the AM  and 2 capsules at night 30 capsule 3    azithromycin (ZITHROMAX) 250 MG tablet Take 2 tablets on day 1 and 1 tablet day 2-5 1 packet 0    lisinopril (PRINIVIL;ZESTRIL) 20 MG tablet TAKE ONE TABLET BY MOUTH ONCE A DAY 90 tablet 1    Semaglutide,0.25 or 0.5MG/DOS, (OZEMPIC, 0.25 OR 0.5 MG/DOSE,) 2 MG/3ML SOPN Inject 0.5 mg into the skin once a week 3 mL 1    Semaglutide,0.25 or 0.5MG/DOS, (OZEMPIC, 0.25 OR 0.5 MG/DOSE,) 2 MG/3ML SOPN Inject 0.5 mg into the skin once a

## 2024-11-07 DIAGNOSIS — G47.00 INSOMNIA, UNSPECIFIED TYPE: ICD-10-CM

## 2024-11-07 RX ORDER — TRAZODONE HYDROCHLORIDE 50 MG/1
50 TABLET, FILM COATED ORAL NIGHTLY
Qty: 30 TABLET | Refills: 5 | Status: SHIPPED | OUTPATIENT
Start: 2024-11-07

## 2024-11-07 NOTE — TELEPHONE ENCOUNTER
Last Office Visit  -  10/18/24  Next Office Visit  -  11/8/24    Last Filled  -  5/2/24  Last UDS -    Contract -

## 2024-11-08 ENCOUNTER — OFFICE VISIT (OUTPATIENT)
Dept: FAMILY MEDICINE CLINIC | Age: 49
End: 2024-11-08

## 2024-11-08 VITALS
SYSTOLIC BLOOD PRESSURE: 138 MMHG | BODY MASS INDEX: 46.56 KG/M2 | DIASTOLIC BLOOD PRESSURE: 70 MMHG | HEIGHT: 63 IN | OXYGEN SATURATION: 96 % | WEIGHT: 262.8 LBS | HEART RATE: 99 BPM

## 2024-11-08 DIAGNOSIS — E11.9 TYPE 2 DIABETES MELLITUS WITHOUT COMPLICATION, WITHOUT LONG-TERM CURRENT USE OF INSULIN (HCC): Primary | ICD-10-CM

## 2024-11-08 DIAGNOSIS — Z23 NEED FOR VACCINATION: ICD-10-CM

## 2024-11-08 ASSESSMENT — ENCOUNTER SYMPTOMS
GASTROINTESTINAL NEGATIVE: 1
RESPIRATORY NEGATIVE: 1

## 2024-11-08 NOTE — PROGRESS NOTES
Patient: Gómez Sewell is a 49 y.o. female who presents today with the following Chief Complaint(s):  Chief Complaint   Patient presents with    Diabetes       Assessment:  Encounter Diagnoses   Name Primary?    Type 2 diabetes mellitus without complication, without long-term current use of insulin (HCC) Yes    Need for vaccination        Plan:  1. Type 2 diabetes mellitus without complication, without long-term current use of insulin (HCC)  Overall patient has been stable.  Continue current medications and check fasting lipids.  Follow-up in 3 months.  - Lipid, Fasting; Future    2. Need for vaccination  - Influenza, FLUCELVAX Trivalent, (age 6 mo+) IM, Preservative Free, 0.5mL      HPI  Patient presents today for follow up on diabetes.  Patient states she is feeling well and reports her medications are working well.  She is taking her gabapentin 3 tablets all at once in the evening and she states that is working better for her.  She reports good compliance with her diabetic medications and her last A1c was 6.2 in August.  Will check blood work at next visit.    Current Outpatient Medications   Medication Sig Dispense Refill    traZODone (DESYREL) 50 MG tablet TAKE ONE TABLET BY MOUTH EVERY NIGHT 30 tablet 5    gabapentin (NEURONTIN) 100 MG capsule Take 1  capsule  in the AM  and 2 capsules at night 30 capsule 3    azithromycin (ZITHROMAX) 250 MG tablet Take 2 tablets on day 1 and 1 tablet day 2-5 1 packet 0    lisinopril (PRINIVIL;ZESTRIL) 20 MG tablet TAKE ONE TABLET BY MOUTH ONCE A DAY 90 tablet 1    Semaglutide,0.25 or 0.5MG/DOS, (OZEMPIC, 0.25 OR 0.5 MG/DOSE,) 2 MG/3ML SOPN Inject 0.5 mg into the skin once a week 3 mL 1    Semaglutide,0.25 or 0.5MG/DOS, (OZEMPIC, 0.25 OR 0.5 MG/DOSE,) 2 MG/3ML SOPN Inject 0.5 mg into the skin once a week 3 mL 0    methylPREDNISolone (MEDROL, HOLDEN,) 4 MG tablet Take 6 tablets all at once on day 1, 5 tablets on day 2, 4 tablets on day 3, 3 tablets on day 4 and 2 tablets on day

## 2024-11-16 ENCOUNTER — PATIENT MESSAGE (OUTPATIENT)
Dept: FAMILY MEDICINE CLINIC | Age: 49
End: 2024-11-16

## 2024-11-19 RX ORDER — DULOXETIN HYDROCHLORIDE 30 MG/1
30 CAPSULE, DELAYED RELEASE ORAL 2 TIMES DAILY
Qty: 60 CAPSULE | Refills: 3 | OUTPATIENT
Start: 2024-11-19

## 2024-11-19 RX ORDER — DULOXETIN HYDROCHLORIDE 30 MG/1
30 CAPSULE, DELAYED RELEASE ORAL 2 TIMES DAILY
Qty: 60 CAPSULE | Refills: 3 | Status: SHIPPED | OUTPATIENT
Start: 2024-11-19

## 2024-11-19 NOTE — TELEPHONE ENCOUNTER
Last Office Visit  -  11/8/24  Next Office Visit  -  n/a    Last Filled  -  7/5/24  Last UDS -    Contract -

## 2024-11-20 ENCOUNTER — OFFICE VISIT (OUTPATIENT)
Dept: ORTHOPEDIC SURGERY | Age: 49
End: 2024-11-20
Payer: COMMERCIAL

## 2024-11-20 ENCOUNTER — TELEPHONE (OUTPATIENT)
Dept: ORTHOPEDIC SURGERY | Age: 49
End: 2024-11-20

## 2024-11-20 VITALS — BODY MASS INDEX: 46.42 KG/M2 | WEIGHT: 262 LBS | HEIGHT: 63 IN

## 2024-11-20 DIAGNOSIS — R20.0 NUMBNESS AND TINGLING OF BOTH FEET: Primary | ICD-10-CM

## 2024-11-20 DIAGNOSIS — M51.26 PROTRUSION OF LUMBAR INTERVERTEBRAL DISC: ICD-10-CM

## 2024-11-20 DIAGNOSIS — G95.9 MYELOPATHY (HCC): ICD-10-CM

## 2024-11-20 DIAGNOSIS — R20.2 NUMBNESS AND TINGLING OF BOTH FEET: Primary | ICD-10-CM

## 2024-11-20 PROCEDURE — 99214 OFFICE O/P EST MOD 30 MIN: CPT | Performed by: PHYSICIAN ASSISTANT

## 2024-11-20 NOTE — PROGRESS NOTES
Rfl: 0    omeprazole (PRILOSEC) 40 MG delayed release capsule, Take 1 capsule by mouth daily, Disp: 90 capsule, Rfl: 3    atorvastatin (LIPITOR) 40 MG tablet, Take 1 tablet by mouth daily, Disp: 90 tablet, Rfl: 3    blood glucose monitor kit and supplies, Test 2 times a day & as needed for symptoms of irregular blood glucose., Disp: 1 kit, Rfl: 0    blood glucose monitor strips, Test 2 times a day & as needed for symptoms of irregular blood glucose., Disp: 100 strip, Rfl: 2    Lancets MISC, To test glucose 2 times daily, Disp: 100 each, Rfl: 2    Alcohol Swabs PADS, To check glucose 2  Times daily, Disp: 100 each, Rfl: 2    Blood Pressure KIT, To check blood pressure daily, Disp: 1 kit, Rfl: 0    albuterol sulfate HFA (PROAIR HFA) 108 (90 Base) MCG/ACT inhaler, Inhale 2 puffs into the lungs every 6 hours as needed for Wheezing, Disp: 1 each, Rfl: 2    fluticasone (FLONASE) 50 MCG/ACT nasal spray, 2 sprays by Each Nostril route daily, Disp: 3 each, Rfl: 3    calcium carbonate (OYSTER SHELL CALCIUM 500 MG) 1250 (500 Ca) MG tablet, Take 1 tablet by mouth 2 times daily, Disp: , Rfl:     Multiple Vitamins-Minerals (THERAPEUTIC MULTIVITAMIN-MINERALS) tablet, Take 2 tablets by mouth daily, Disp: , Rfl:   Allergies:  Biaxin [clarithromycin], Diflucan [fluconazole], Dilaudid [hydromorphone hcl], Erythromycin, Eszopiclone, Hydromorphone, Terbinafine and related, Jardiance [empagliflozin], and Metformin and related  Social History:    reports that she has been smoking cigarettes. She started smoking about 29 years ago. She has a 25 pack-year smoking history. She has never used smokeless tobacco. She reports current alcohol use of about 2.0 standard drinks of alcohol per week. She reports that she does not use drugs.  Family History:   Family History   Problem Relation Age of Onset    Diabetes Mother     High Blood Pressure Mother     Cancer Mother 60        lung    Elevated Lipids Mother     Heart Disease Mother     Stroke

## 2024-12-04 ENCOUNTER — PATIENT MESSAGE (OUTPATIENT)
Dept: FAMILY MEDICINE CLINIC | Age: 49
End: 2024-12-04

## 2024-12-04 DIAGNOSIS — R20.2 NUMBNESS AND TINGLING: ICD-10-CM

## 2024-12-04 DIAGNOSIS — R20.0 NUMBNESS AND TINGLING: ICD-10-CM

## 2024-12-04 DIAGNOSIS — K21.9 GASTROESOPHAGEAL REFLUX DISEASE WITHOUT ESOPHAGITIS: Primary | ICD-10-CM

## 2024-12-19 ENCOUNTER — TELEPHONE (OUTPATIENT)
Dept: FAMILY MEDICINE CLINIC | Age: 49
End: 2024-12-19

## 2024-12-19 DIAGNOSIS — G62.9 NEUROPATHY: ICD-10-CM

## 2024-12-19 RX ORDER — GABAPENTIN 100 MG/1
CAPSULE ORAL
Qty: 90 CAPSULE | Refills: 3 | Status: SHIPPED | OUTPATIENT
Start: 2024-12-19 | End: 2025-02-20

## 2024-12-19 RX ORDER — GABAPENTIN 100 MG/1
CAPSULE ORAL
Qty: 30 CAPSULE | Refills: 3 | Status: SHIPPED | OUTPATIENT
Start: 2024-12-19 | End: 2024-12-19 | Stop reason: SDUPTHER

## 2024-12-19 NOTE — TELEPHONE ENCOUNTER
Odette from [facility] called to clarify a patients RX for gabapentin...need to clarify quantity for a months supply     Call back number [phone]

## 2024-12-19 NOTE — TELEPHONE ENCOUNTER
Last Office Visit  -  11/8/24  Next Office Visit  -  n/a    Last Filled  -  10/18/24  Last UDS -    Contract -

## 2024-12-24 ENCOUNTER — PATIENT MESSAGE (OUTPATIENT)
Dept: FAMILY MEDICINE CLINIC | Age: 49
End: 2024-12-24

## 2024-12-24 DIAGNOSIS — L60.0 INGROWN TOENAIL: Primary | ICD-10-CM

## 2025-01-07 PROBLEM — M54.16 LUMBAR RADICULOPATHY: Status: ACTIVE | Noted: 2025-01-07

## 2025-01-07 PROBLEM — E11.42 DIABETIC PERIPHERAL NEUROPATHY (HCC): Status: ACTIVE | Noted: 2025-01-07

## 2025-01-07 RX ORDER — SEMAGLUTIDE 0.68 MG/ML
INJECTION, SOLUTION SUBCUTANEOUS
Qty: 3 ML | Refills: 1 | Status: SHIPPED | OUTPATIENT
Start: 2025-01-07

## 2025-01-07 NOTE — TELEPHONE ENCOUNTER
Last Office Visit  -  11/8/24  Next Office Visit  -  n/a    Last Filled  -  10/15/24  Last UDS -    Contract -

## 2025-01-08 ENCOUNTER — HOSPITAL ENCOUNTER (OUTPATIENT)
Dept: WOMENS IMAGING | Age: 50
Discharge: HOME | End: 2025-01-08
Payer: COMMERCIAL

## 2025-01-08 VITALS — WEIGHT: 262 LBS | BODY MASS INDEX: 46.42 KG/M2 | HEIGHT: 63 IN

## 2025-01-08 DIAGNOSIS — K76.0 METABOLIC DYSFUNCTION-ASSOCIATED STEATOTIC LIVER DISEASE (MASLD): Primary | ICD-10-CM

## 2025-01-08 DIAGNOSIS — Z01.419 WELL WOMAN EXAM WITH ROUTINE GYNECOLOGICAL EXAM: ICD-10-CM

## 2025-01-08 PROCEDURE — 77063 BREAST TOMOSYNTHESIS BI: CPT

## 2025-01-09 ENCOUNTER — OFFICE VISIT (OUTPATIENT)
Age: 50
End: 2025-01-09
Payer: COMMERCIAL

## 2025-01-09 VITALS
DIASTOLIC BLOOD PRESSURE: 85 MMHG | HEART RATE: 84 BPM | BODY MASS INDEX: 46.42 KG/M2 | HEIGHT: 63 IN | SYSTOLIC BLOOD PRESSURE: 128 MMHG | WEIGHT: 262 LBS | OXYGEN SATURATION: 98 %

## 2025-01-09 DIAGNOSIS — E11.42 DIABETIC POLYNEUROPATHY ASSOCIATED WITH TYPE 2 DIABETES MELLITUS (HCC): ICD-10-CM

## 2025-01-09 DIAGNOSIS — R20.2 NUMBNESS AND TINGLING OF BOTH FEET: Primary | ICD-10-CM

## 2025-01-09 DIAGNOSIS — R20.0 NUMBNESS AND TINGLING OF BOTH FEET: Primary | ICD-10-CM

## 2025-01-09 PROCEDURE — 99213 OFFICE O/P EST LOW 20 MIN: CPT | Performed by: PSYCHIATRY & NEUROLOGY

## 2025-01-09 NOTE — PATIENT INSTRUCTIONS

## 2025-01-09 NOTE — PROGRESS NOTES
Neurology outpatient F/U visit    Patient name: Gómez Sewell      Chief Complaint:  Abnormal sensation on both legs.    History of present illness:  This is a 48 years old right-handed female.  The patient is here for evaluation of abnormal sensation on both legs.  The onset was about a year ago.  The course is slowly progressive.  The patient reports tingling sensation and loss of sensation from her feet up to her thighs.  The patient denies significant back pain or bladder incontinence.  The patient had MRI whole spine in the past.  The MRI C-spine showed enhancing cord lesion at C3-C4.  It was questionable for artifact.    Interval History:  08/14/24: The patient is here for follow-up due to new onset of headache.  The patient had MRI brain, MRI cervical spine and spinal tap on the last visit after the patient had questionable lesion at C3-C4.  The MRI brain was unremarkable.  The MRI cervical spine showed questionable artifacts of C3 and C4.  The patient had negative profiles on CSF.      The patient started the new onset of headache about a week ago.  The headache is mainly on the left side of her head.  The patient reports significant sharp pain probably from the hospital area.  The patient denies similar headache in the past.  The patient also denies significant nauseous, phonophobia or photophobia with a headache.    01/09/25: The patient is here for evaluation regarding abnormal sensation on both legs.  The patient had normal EMG done from outside hospital.  The patient reports significant benefit with gabapentin from her PCP.  The patient denies any new focal weakness or numbness at this time.    Past medical history:    Past Medical History:   Diagnosis Date    Anesthesia     felt like she was unable to breathe after surgery at age 16- please keep O2 on in recovery     Arthritis     Bicuspid cardiac valve     slight leak- OK per echocardiogram     Depression     Dysplasia of cervix     Fatty liver

## 2025-01-27 ENCOUNTER — TELEPHONE (OUTPATIENT)
Age: 50
End: 2025-01-27

## 2025-01-27 NOTE — TELEPHONE ENCOUNTER
Pt called requesting Left occipital neuralgia nerve block. Told pt Dr. Aguiar's schedule was full, but would send a note to watch for cancellations.     Please advise on scheduling.

## 2025-01-27 NOTE — TELEPHONE ENCOUNTER
I sent her a [employer] message offering 2:20 appt tomorrow in [place].  Will await response from pt and schedule accordingly.

## 2025-01-28 ENCOUNTER — OFFICE VISIT (OUTPATIENT)
Age: 50
End: 2025-01-28
Payer: COMMERCIAL

## 2025-01-28 VITALS
WEIGHT: 262 LBS | SYSTOLIC BLOOD PRESSURE: 138 MMHG | HEIGHT: 63 IN | BODY MASS INDEX: 46.42 KG/M2 | OXYGEN SATURATION: 98 % | DIASTOLIC BLOOD PRESSURE: 84 MMHG | HEART RATE: 81 BPM

## 2025-01-28 DIAGNOSIS — M54.81 BILATERAL OCCIPITAL NEURALGIA: Primary | ICD-10-CM

## 2025-01-28 PROCEDURE — 64405 NJX AA&/STRD GR OCPL NRV: CPT | Performed by: PSYCHIATRY & NEUROLOGY

## 2025-01-28 PROCEDURE — 64450 NJX AA&/STRD OTHER PN/BRANCH: CPT | Performed by: PSYCHIATRY & NEUROLOGY

## 2025-01-28 RX ORDER — CEPHALEXIN 500 MG/1
500 CAPSULE ORAL 2 TIMES DAILY
COMMUNITY
Start: 2025-01-22

## 2025-01-28 RX ORDER — LIDOCAINE HYDROCHLORIDE 20 MG/ML
5 INJECTION, SOLUTION INFILTRATION; PERINEURAL ONCE
Status: COMPLETED | OUTPATIENT
Start: 2025-01-28 | End: 2025-01-28

## 2025-01-28 RX ORDER — DEXAMETHASONE SODIUM PHOSPHATE 10 MG/ML
10 INJECTION INTRAMUSCULAR; INTRAVENOUS ONCE
Status: COMPLETED | OUTPATIENT
Start: 2025-01-28 | End: 2025-01-28

## 2025-01-28 RX ORDER — CHLORHEXIDINE GLUCONATE ORAL RINSE 1.2 MG/ML
15 SOLUTION DENTAL 2 TIMES DAILY
COMMUNITY
Start: 2025-01-17

## 2025-01-28 RX ADMIN — LIDOCAINE HYDROCHLORIDE 5 ML: 20 INJECTION, SOLUTION INFILTRATION; PERINEURAL at 14:58

## 2025-01-28 RX ADMIN — DEXAMETHASONE SODIUM PHOSPHATE 10 MG: 10 INJECTION INTRAMUSCULAR; INTRAVENOUS at 14:50

## 2025-01-28 NOTE — PATIENT INSTRUCTIONS

## 2025-01-28 NOTE — PROGRESS NOTES
Occipital nerve block    Indication:    Occipital neuralgia    Technique:    Identify landmarks and chayo the injection site as the diagram below  Move hair from area (e.g. with assistant or lubricating jelly can be applied)  Clean the area (e.g. hibiclens, betadine, or Alcohol)    Preparation:    Syringe: 5 cc  Needle: 27 gauge 1.25\"  1% Lidocaine 3 cc  Dexamethasone 10 mg or 1 cc    Injection:    Insert the needle from inferior approach  Angle approximately 30-45 degrees and insert until striking periosteum  Aspirate for blood and if found, withdraw and redirect needle (to prevent intravascular injection)  Inject a total of 2-3 cc of medication at site, distributing in a fan-shaped distribution, NIVIA technique

## 2025-02-01 ENCOUNTER — HOSPITAL ENCOUNTER (OUTPATIENT)
Dept: ULTRASOUND IMAGING | Age: 50
Discharge: HOME | End: 2025-02-01
Payer: COMMERCIAL

## 2025-02-01 ENCOUNTER — HOSPITAL ENCOUNTER (OUTPATIENT)
Age: 50
Discharge: HOME | End: 2025-02-01
Payer: COMMERCIAL

## 2025-02-01 DIAGNOSIS — R20.2 NUMBNESS AND TINGLING: ICD-10-CM

## 2025-02-01 DIAGNOSIS — K76.0 METABOLIC DYSFUNCTION-ASSOCIATED STEATOTIC LIVER DISEASE (MASLD): ICD-10-CM

## 2025-02-01 DIAGNOSIS — R20.0 NUMBNESS AND TINGLING: ICD-10-CM

## 2025-02-01 DIAGNOSIS — E11.9 TYPE 2 DIABETES MELLITUS WITHOUT COMPLICATION, WITHOUT LONG-TERM CURRENT USE OF INSULIN (HCC): ICD-10-CM

## 2025-02-01 LAB
ALBUMIN SERPL-MCNC: 3.9 G/DL (ref 3.4–5)
ALP SERPL-CCNC: 84 U/L (ref 40–129)
ALT SERPL-CCNC: 23 U/L (ref 10–40)
AST SERPL-CCNC: 17 U/L (ref 15–37)
BILIRUB DIRECT SERPL-MCNC: 0.2 MG/DL (ref 0–0.3)
BILIRUB INDIRECT SERPL-MCNC: 0.1 MG/DL (ref 0–1)
BILIRUB SERPL-MCNC: 0.3 MG/DL (ref 0–1)
CHOLEST SERPL-MCNC: 199 MG/DL (ref 0–199)
HBV SURFACE AB SERPL IA-ACNC: >1000 MIU/ML
HBV SURFACE AG SERPL QL IA: NORMAL
HCV AB SERPL QL IA: NORMAL
HDLC SERPL-MCNC: 44 MG/DL (ref 40–60)
LDL CHOLESTEROL: 95 MG/DL
PROT SERPL-MCNC: 6.6 G/DL (ref 6.4–8.2)
TRIGL SERPL-MCNC: 300 MG/DL (ref 0–150)
VIT B12 SERPL-MCNC: 422 PG/ML (ref 211–911)
VLDLC SERPL CALC-MCNC: 60 MG/DL

## 2025-02-01 PROCEDURE — 36415 COLL VENOUS BLD VENIPUNCTURE: CPT

## 2025-02-01 PROCEDURE — 76705 ECHO EXAM OF ABDOMEN: CPT

## 2025-02-01 PROCEDURE — 87340 HEPATITIS B SURFACE AG IA: CPT

## 2025-02-01 PROCEDURE — 82607 VITAMIN B-12: CPT

## 2025-02-01 PROCEDURE — 86803 HEPATITIS C AB TEST: CPT

## 2025-02-01 PROCEDURE — 86704 HEP B CORE ANTIBODY TOTAL: CPT

## 2025-02-01 PROCEDURE — 80076 HEPATIC FUNCTION PANEL: CPT

## 2025-02-01 PROCEDURE — 80061 LIPID PANEL: CPT

## 2025-02-01 PROCEDURE — 76981 USE PARENCHYMA: CPT

## 2025-02-01 PROCEDURE — 86706 HEP B SURFACE ANTIBODY: CPT

## 2025-02-01 PROCEDURE — 86708 HEPATITIS A ANTIBODY: CPT

## 2025-02-03 LAB
HAV AB SER QL IA: NEGATIVE
HBV CORE AB SERPL QL IA: NEGATIVE

## 2025-02-16 ENCOUNTER — OFFICE VISIT (OUTPATIENT)
Age: 50
End: 2025-02-16

## 2025-02-16 VITALS — DIASTOLIC BLOOD PRESSURE: 82 MMHG | SYSTOLIC BLOOD PRESSURE: 164 MMHG | OXYGEN SATURATION: 95 % | HEART RATE: 93 BPM

## 2025-02-16 DIAGNOSIS — J10.1 INFLUENZA A: Primary | ICD-10-CM

## 2025-02-16 DIAGNOSIS — R05.1 ACUTE COUGH: ICD-10-CM

## 2025-02-16 LAB
INFLUENZA A ANTIGEN, POC: POSITIVE
INFLUENZA B ANTIGEN, POC: NEGATIVE

## 2025-02-16 RX ORDER — OSELTAMIVIR PHOSPHATE 75 MG/1
75 CAPSULE ORAL 2 TIMES DAILY
Qty: 10 CAPSULE | Refills: 0 | Status: SHIPPED | OUTPATIENT
Start: 2025-02-16 | End: 2025-02-21

## 2025-02-19 ENCOUNTER — TELEMEDICINE (OUTPATIENT)
Dept: ORTHOPEDIC SURGERY | Age: 50
End: 2025-02-19
Payer: COMMERCIAL

## 2025-02-19 DIAGNOSIS — R20.0 NUMBNESS AND TINGLING OF BOTH FEET: Primary | ICD-10-CM

## 2025-02-19 DIAGNOSIS — M51.26 PROTRUSION OF LUMBAR INTERVERTEBRAL DISC: ICD-10-CM

## 2025-02-19 DIAGNOSIS — G89.29 CHRONIC BILATERAL LOW BACK PAIN WITH LEFT-SIDED SCIATICA: ICD-10-CM

## 2025-02-19 DIAGNOSIS — R20.2 NUMBNESS AND TINGLING OF BOTH FEET: Primary | ICD-10-CM

## 2025-02-19 DIAGNOSIS — M54.42 CHRONIC BILATERAL LOW BACK PAIN WITH LEFT-SIDED SCIATICA: ICD-10-CM

## 2025-02-19 DIAGNOSIS — M54.2 NECK PAIN: ICD-10-CM

## 2025-02-19 DIAGNOSIS — M50.20 PROTRUSION OF CERVICAL INTERVERTEBRAL DISC: ICD-10-CM

## 2025-02-19 PROCEDURE — 99213 OFFICE O/P EST LOW 20 MIN: CPT | Performed by: PHYSICIAN ASSISTANT

## 2025-02-19 NOTE — PROGRESS NOTES
TELEPHONE VISIT: SPINE    CHIEF COMPLAINT:  Spine pain     Patient provided verbal consent to proceed with telephone visit; aware he/she may receive a bill for this telephone service, depending on insurance coverage.     Individuals present during telemedicine consultation-Leisa Oquendo, CHRISTOPHER in [place], [place] & the patient in [place], [place]      The patient is a 49 y.o. female well known to me, history of GERD, LM, here to follow-up for spine pain and LE paresthesias. She reports chronic left > right foot numbness affecting all digits and now extending into the calves in a stocking distribution--this is stable and slightly improved.  She also reports underlying chronic neck & low back pain which is typically aggravated with prolonged standing--this is stable. Spine MRIs were ordered to assess for cord abnormality--questionable cord lesion C3-4, neurology is following and reportedly ruled out MS. updated cervical MRI did not reproduce cord lesion.  Thoracic and lumbar MRI scans showed no significant neural compression., L4-5 disc bulging.      Conservative care includes: Physical therapy, gabapentin 100 mg TID PRN daily with PCP, Tylenol, prednisone, Cymbalta.  At times she does report some subjective foot weakness which is episodic.  She reports improvement of her lower extremity symptoms taking gabapentin--she is slowly titrating up with PCP.  She also feels that PT has been helpful.  She denies any progressive extremity weakness.  She currently denies any distal radiating pain.  She denies any fine motor difficulty.  At time she does report some gait instability.  Currently denies any clear-cut upper extremity radiating symptoms.  She denies any recent injury or direct trauma.  She was recently diagnosed with flu but is currently taking Tamiflu with improvement    She did have a recent consultation with Dr. Castaneda whom recommended considering L4-5 LESI and SCS trial.  She is currently not interested in

## 2025-02-20 ENCOUNTER — PATIENT MESSAGE (OUTPATIENT)
Dept: FAMILY MEDICINE CLINIC | Age: 50
End: 2025-02-20

## 2025-02-20 ENCOUNTER — OFFICE VISIT (OUTPATIENT)
Dept: FAMILY MEDICINE CLINIC | Age: 50
End: 2025-02-20

## 2025-02-20 VITALS
HEIGHT: 63 IN | OXYGEN SATURATION: 96 % | WEIGHT: 261.6 LBS | HEART RATE: 88 BPM | BODY MASS INDEX: 46.35 KG/M2 | DIASTOLIC BLOOD PRESSURE: 98 MMHG | SYSTOLIC BLOOD PRESSURE: 170 MMHG

## 2025-02-20 DIAGNOSIS — J40 BRONCHITIS: Primary | ICD-10-CM

## 2025-02-20 DIAGNOSIS — J10.1 INFLUENZA A: ICD-10-CM

## 2025-02-20 RX ORDER — AZITHROMYCIN 250 MG/1
TABLET, FILM COATED ORAL
Qty: 1 PACKET | Refills: 0 | Status: SHIPPED | OUTPATIENT
Start: 2025-02-20

## 2025-02-20 RX ORDER — ALBUTEROL SULFATE 90 UG/1
2 INHALANT RESPIRATORY (INHALATION) EVERY 6 HOURS PRN
Qty: 1 EACH | Refills: 2 | Status: SHIPPED | OUTPATIENT
Start: 2025-02-20

## 2025-02-20 RX ORDER — PREDNISONE 20 MG/1
20 TABLET ORAL DAILY
Qty: 5 TABLET | Refills: 0 | Status: SHIPPED | OUTPATIENT
Start: 2025-02-20 | End: 2025-02-25

## 2025-02-20 RX ORDER — IPRATROPIUM BROMIDE AND ALBUTEROL SULFATE 2.5; .5 MG/3ML; MG/3ML
1 SOLUTION RESPIRATORY (INHALATION) ONCE
Status: COMPLETED | OUTPATIENT
Start: 2025-02-20 | End: 2025-02-20

## 2025-02-20 RX ORDER — DEXTROMETHORPHAN HYDROBROMIDE AND PROMETHAZINE HYDROCHLORIDE 15; 6.25 MG/5ML; MG/5ML
5 SYRUP ORAL 4 TIMES DAILY PRN
Qty: 180 ML | Refills: 0 | Status: SHIPPED | OUTPATIENT
Start: 2025-02-20

## 2025-02-20 RX ADMIN — IPRATROPIUM BROMIDE AND ALBUTEROL SULFATE 1 DOSE: 2.5; .5 SOLUTION RESPIRATORY (INHALATION) at 17:08

## 2025-02-20 SDOH — ECONOMIC STABILITY: FOOD INSECURITY: WITHIN THE PAST 12 MONTHS, THE FOOD YOU BOUGHT JUST DIDN'T LAST AND YOU DIDN'T HAVE MONEY TO GET MORE.: NEVER TRUE

## 2025-02-20 SDOH — ECONOMIC STABILITY: FOOD INSECURITY: WITHIN THE PAST 12 MONTHS, YOU WORRIED THAT YOUR FOOD WOULD RUN OUT BEFORE YOU GOT MONEY TO BUY MORE.: NEVER TRUE

## 2025-02-20 ASSESSMENT — ENCOUNTER SYMPTOMS
SINUS PRESSURE: 0
SHORTNESS OF BREATH: 1
WHEEZING: 1
COUGH: 1
SINUS PAIN: 0
GASTROINTESTINAL NEGATIVE: 1
SORE THROAT: 0

## 2025-02-20 NOTE — PROGRESS NOTES
Patient: Gómez Sewell is a 49 y.o. female who presents today with the following Chief Complaint(s):  Chief Complaint   Patient presents with    Cough    Shortness of Breath       Assessment:  Encounter Diagnoses   Name Primary?    Bronchitis Yes    Influenza A        Plan:  Assessment & Plan  Bronchitis    Likely secondary infection, Will treat with prednisone albuterol inhaler and a Z-Leobardo.  Patient was given a DuoNeb nebulizer in the office today.    Orders:    predniSONE (DELTASONE) 20 MG tablet; Take 1 tablet by mouth daily for 5 days    albuterol sulfate HFA (PROAIR HFA) 108 (90 Base) MCG/ACT inhaler; Inhale 2 puffs into the lungs every 6 hours as needed for Wheezing    promethazine-dextromethorphan (PROMETHAZINE-DM) 6.25-15 MG/5ML syrup; Take 5 mLs by mouth 4 times daily as needed for Cough    azithromycin (ZITHROMAX) 250 MG tablet; Take 2 tablets on day 1 and 1 tablet day 2-5    ipratropium 0.5 mg-albuterol 2.5 mg (DUONEB) nebulizer solution 1 Dose    Influenza A    Finish last dose of tamiflu              HPI  Patient presents today with concerns of worsening cough and congestion.  She was seen on the 16th at urgent care and diagnosed with influenza A.  She was given Tamiflu and has been taking that.  She states her cough was not even an issue when she was seen in urgent care.  Her cough really just started in the past couple of days.  She is a current every day smoker.  She is feeling short of breath and wheezy and states her cough is constant.  She states she has 1 more dose of the Tamiflu left and her other symptoms are proving.  She denies any fever and denies any nasal congestion.  She is having a productive cough and it is brown in color.        Current Outpatient Medications   Medication Sig Dispense Refill    predniSONE (DELTASONE) 20 MG tablet Take 1 tablet by mouth daily for 5 days 5 tablet 0    albuterol sulfate HFA (PROAIR HFA) 108 (90 Base) MCG/ACT inhaler Inhale 2 puffs into the lungs every 6

## 2025-02-28 ENCOUNTER — OFFICE VISIT (OUTPATIENT)
Dept: FAMILY MEDICINE CLINIC | Age: 50
End: 2025-02-28
Payer: COMMERCIAL

## 2025-02-28 VITALS
HEIGHT: 63 IN | SYSTOLIC BLOOD PRESSURE: 170 MMHG | WEIGHT: 265.4 LBS | OXYGEN SATURATION: 95 % | BODY MASS INDEX: 47.02 KG/M2 | DIASTOLIC BLOOD PRESSURE: 70 MMHG | HEART RATE: 100 BPM

## 2025-02-28 DIAGNOSIS — I10 ELEVATED BLOOD PRESSURE READING IN OFFICE WITH DIAGNOSIS OF HYPERTENSION: ICD-10-CM

## 2025-02-28 DIAGNOSIS — K21.9 GASTROESOPHAGEAL REFLUX DISEASE WITHOUT ESOPHAGITIS: Primary | ICD-10-CM

## 2025-02-28 PROCEDURE — 3078F DIAST BP <80 MM HG: CPT | Performed by: NURSE PRACTITIONER

## 2025-02-28 PROCEDURE — 3077F SYST BP >= 140 MM HG: CPT | Performed by: NURSE PRACTITIONER

## 2025-02-28 PROCEDURE — 99214 OFFICE O/P EST MOD 30 MIN: CPT | Performed by: NURSE PRACTITIONER

## 2025-02-28 RX ORDER — PANTOPRAZOLE SODIUM 40 MG/1
40 TABLET, DELAYED RELEASE ORAL 2 TIMES DAILY
Qty: 180 TABLET | Refills: 1 | Status: SHIPPED | OUTPATIENT
Start: 2025-02-28

## 2025-02-28 ASSESSMENT — ENCOUNTER SYMPTOMS
GASTROINTESTINAL NEGATIVE: 1
COUGH: 1

## 2025-02-28 NOTE — PROGRESS NOTES
Patient: Gómez Sewell is a 49 y.o. female who presents today with the following Chief Complaint(s):  Chief Complaint   Patient presents with    Discuss Medications     Discuss acid reflux medications       Assessment:  Encounter Diagnosis   Name Primary?    Gastroesophageal reflux disease without esophagitis Yes       Plan:  Assessment & Plan  Gastroesophageal reflux disease without esophagitis  Chronic, unstable,Will switch medication to pantoprazole 40 mg twice daily and patient will follow-up with GI.    Orders:    pantoprazole (PROTONIX) 40 MG tablet; Take 1 tablet by mouth in the morning and at bedtime    Elevated blood pressure reading in office with diagnosis of hypertension   Chronic, not at goal (unstable), continue current treatment plan.  Patient had not taken her medications today.  Will have her follow-up in 1 month and recheck blood pressure.              HPI  Patient presents today to discuss reflux symptoms.  She states she is having a lot of trouble with coughing at night she has coughed so hard that it is caused some vomiting.  She was taking omeprazole 40 mg once daily.  She then added Prevacid once in the morning and took the omeprazole at night and that did seem to help the symptoms but she would like to try something else that she could take twice a day without having to take 2 different medications.  She denies any burning sensation in the throat.  She does have a GI and she states she will mention her symptoms to them as well and possibly do a scope in future.    Current Outpatient Medications   Medication Sig Dispense Refill    pantoprazole (PROTONIX) 40 MG tablet Take 1 tablet by mouth in the morning and at bedtime 180 tablet 1    promethazine-dextromethorphan (PROMETHAZINE-DM) 6.25-15 MG/5ML syrup Take 5 mLs by mouth 4 times daily as needed for Cough 180 mL 0    OZEMPIC, 0.25 OR 0.5 MG/DOSE, 2 MG/3ML SOPN INJECT 0.5MG UNDER THE SKIN ONCE WEEKLY 3 mL 1    gabapentin (NEURONTIN) 100 MG

## 2025-03-10 RX ORDER — SEMAGLUTIDE 0.68 MG/ML
INJECTION, SOLUTION SUBCUTANEOUS
Qty: 3 ML | Refills: 1 | Status: SHIPPED | OUTPATIENT
Start: 2025-03-10

## 2025-03-10 NOTE — TELEPHONE ENCOUNTER
Last Office Visit  -  2/28/25  Next Office Visit  -  3/28/25    Last Filled  -  1/7/25  Last UDS -    Contract -

## 2025-03-21 ENCOUNTER — PATIENT MESSAGE (OUTPATIENT)
Dept: FAMILY MEDICINE CLINIC | Age: 50
End: 2025-03-21

## 2025-03-21 RX ORDER — DULOXETIN HYDROCHLORIDE 30 MG/1
30 CAPSULE, DELAYED RELEASE ORAL 2 TIMES DAILY
Qty: 60 CAPSULE | Refills: 3 | Status: SHIPPED | OUTPATIENT
Start: 2025-03-21

## 2025-03-21 RX ORDER — DULOXETIN HYDROCHLORIDE 30 MG/1
30 CAPSULE, DELAYED RELEASE ORAL 2 TIMES DAILY
Qty: 60 CAPSULE | Refills: 3 | Status: CANCELLED | OUTPATIENT
Start: 2025-03-21

## 2025-03-21 NOTE — TELEPHONE ENCOUNTER
Internal Medicine Last Office Visit  -  2/28/25  Next Office Visit  -  3/28/25    Last Filled  -  11/19/24  Last UDS -    Contract -

## 2025-04-01 ENCOUNTER — HOSPITAL ENCOUNTER (EMERGENCY)
Age: 50
Discharge: HOME | End: 2025-04-01
Payer: COMMERCIAL

## 2025-04-01 ENCOUNTER — APPOINTMENT (OUTPATIENT)
Dept: VASCULAR LAB | Age: 50
End: 2025-04-01
Payer: COMMERCIAL

## 2025-04-01 ENCOUNTER — PATIENT MESSAGE (OUTPATIENT)
Dept: FAMILY MEDICINE CLINIC | Age: 50
End: 2025-04-01

## 2025-04-01 VITALS
WEIGHT: 265.4 LBS | TEMPERATURE: 98.6 F | OXYGEN SATURATION: 98 % | HEART RATE: 70 BPM | BODY MASS INDEX: 47.01 KG/M2 | DIASTOLIC BLOOD PRESSURE: 92 MMHG | SYSTOLIC BLOOD PRESSURE: 159 MMHG | RESPIRATION RATE: 18 BRPM

## 2025-04-01 DIAGNOSIS — M79.605 LEFT LEG PAIN: Primary | ICD-10-CM

## 2025-04-01 PROCEDURE — 99284 EMERGENCY DEPT VISIT MOD MDM: CPT

## 2025-04-01 PROCEDURE — 93971 EXTREMITY STUDY: CPT

## 2025-04-01 RX ORDER — GABAPENTIN 100 MG/1
100-200 CAPSULE ORAL 2 TIMES DAILY
COMMUNITY

## 2025-04-01 ASSESSMENT — PAIN SCALES - GENERAL: PAINLEVEL_OUTOF10: 3

## 2025-04-01 ASSESSMENT — PAIN - FUNCTIONAL ASSESSMENT: PAIN_FUNCTIONAL_ASSESSMENT: 0-10

## 2025-04-01 ASSESSMENT — PAIN DESCRIPTION - LOCATION: LOCATION: LEG

## 2025-04-01 ASSESSMENT — PAIN DESCRIPTION - ORIENTATION: ORIENTATION: LEFT

## 2025-04-01 NOTE — ED PROVIDER NOTES
REPAIR N/A 12/20/2021    ROBOTIC RECURRENT INCARCERATED INCISIONAL HERNIA REPAIR, WITH MESH, ROBOTIC LYSIS OF ADHESIONS performed by Gavino Rojas DO at [facility] OR    HIATAL HERNIA REPAIR N/A 08/25/2020    LAPAROSCOPIC INCARCERATED INCISIONAL HERNIA REPAIR performed by Gavino Rojas DO at [facility] OR    HYSTERECTOMY, TOTAL ABDOMINAL (CERVIX REMOVED) N/A 11/02/2017    still has ovaries per patient    LEEP  2015, 3/2017    SINUS SURGERY      SLEEVE GASTRECTOMY N/A 08/25/2020    ROBOTIC SLEEVE GASTRECTOMY performed by Gavino Rojas DO at [facility] OR    TUBAL LIGATION      TYMPANOSTOMY TUBE PLACEMENT  x2    UPPER GASTROINTESTINAL ENDOSCOPY N/A 02/24/2020    EGD BIOPSY performed by Gavino Rojas DO at [facility] ENDOSCOPY     Family History:  Family History   Problem Relation Age of Onset    Diabetes Mother     High Blood Pressure Mother     Cancer Mother 60        lung    Elevated Lipids Mother     Heart Disease Mother     Stroke Mother     Depression Mother     Heart Attack Mother     High Blood Pressure Father     Elevated Lipids Father     Heart Disease Father     Breast Cancer Maternal Grandmother     High Blood Pressure Maternal Grandmother     Elevated Lipids Maternal Grandmother     Stroke Maternal Grandmother     Diabetes Maternal Grandmother     Cancer Maternal Grandmother     Glaucoma Maternal Grandmother     High Blood Pressure Maternal Grandfather     Elevated Lipids Maternal Grandfather     Stroke Maternal Grandfather     Diabetes Maternal Grandfather     Cancer Maternal Grandfather     High Blood Pressure Paternal Grandmother     Elevated Lipids Paternal Grandmother     Stroke Paternal Grandmother     Diabetes Paternal Grandmother     Cancer Paternal Grandmother     High Blood Pressure Paternal Grandfather     Elevated Lipids Paternal Grandfather     Stroke Paternal Grandfather     Diabetes Paternal Grandfather     Cancer Paternal Grandfather      Social History:  Social History     Socioeconomic History    Marital status:

## 2025-04-01 NOTE — TELEPHONE ENCOUNTER
If it is one sided and she is concerned about a blood clot then I would recommend the ER to be evaluated urgently.

## 2025-04-03 ENCOUNTER — OFFICE VISIT (OUTPATIENT)
Dept: FAMILY MEDICINE CLINIC | Age: 50
End: 2025-04-03
Payer: COMMERCIAL

## 2025-04-03 VITALS
WEIGHT: 266.6 LBS | BODY MASS INDEX: 47.24 KG/M2 | OXYGEN SATURATION: 97 % | SYSTOLIC BLOOD PRESSURE: 160 MMHG | HEART RATE: 86 BPM | DIASTOLIC BLOOD PRESSURE: 80 MMHG | HEIGHT: 63 IN

## 2025-04-03 DIAGNOSIS — M79.89 PAIN AND SWELLING OF LEFT LOWER LEG: Primary | ICD-10-CM

## 2025-04-03 DIAGNOSIS — I10 HYPERTENSION, UNSPECIFIED TYPE: ICD-10-CM

## 2025-04-03 DIAGNOSIS — M79.662 PAIN AND SWELLING OF LEFT LOWER LEG: Primary | ICD-10-CM

## 2025-04-03 DIAGNOSIS — E11.9 TYPE 2 DIABETES MELLITUS WITHOUT COMPLICATION, WITHOUT LONG-TERM CURRENT USE OF INSULIN: ICD-10-CM

## 2025-04-03 LAB — HBA1C MFR BLD: 6.2 %

## 2025-04-03 PROCEDURE — 3044F HG A1C LEVEL LT 7.0%: CPT | Performed by: NURSE PRACTITIONER

## 2025-04-03 PROCEDURE — 3077F SYST BP >= 140 MM HG: CPT | Performed by: NURSE PRACTITIONER

## 2025-04-03 PROCEDURE — 83036 HEMOGLOBIN GLYCOSYLATED A1C: CPT | Performed by: NURSE PRACTITIONER

## 2025-04-03 PROCEDURE — 99214 OFFICE O/P EST MOD 30 MIN: CPT | Performed by: NURSE PRACTITIONER

## 2025-04-03 PROCEDURE — 3079F DIAST BP 80-89 MM HG: CPT | Performed by: NURSE PRACTITIONER

## 2025-04-03 RX ORDER — LISINOPRIL 40 MG/1
40 TABLET ORAL DAILY
Qty: 30 TABLET | Refills: 5 | Status: SHIPPED | OUTPATIENT
Start: 2025-04-03 | End: 2025-04-03

## 2025-04-03 RX ORDER — HYDROCHLOROTHIAZIDE 12.5 MG/1
12.5 CAPSULE ORAL EVERY MORNING
Qty: 90 CAPSULE | Refills: 1 | Status: CANCELLED | OUTPATIENT
Start: 2025-04-03

## 2025-04-03 RX ORDER — LISINOPRIL 40 MG/1
40 TABLET ORAL DAILY
Qty: 30 TABLET | Refills: 5 | Status: SHIPPED | OUTPATIENT
Start: 2025-04-03

## 2025-04-03 ASSESSMENT — ENCOUNTER SYMPTOMS: GASTROINTESTINAL NEGATIVE: 1

## 2025-04-03 NOTE — PROGRESS NOTES
Grandmother     Glaucoma Maternal Grandmother     High Blood Pressure Maternal Grandfather     Elevated Lipids Maternal Grandfather     Stroke Maternal Grandfather     Diabetes Maternal Grandfather     Cancer Maternal Grandfather     High Blood Pressure Paternal Grandmother     Elevated Lipids Paternal Grandmother     Stroke Paternal Grandmother     Diabetes Paternal Grandmother     Cancer Paternal Grandmother     High Blood Pressure Paternal Grandfather     Elevated Lipids Paternal Grandfather     Stroke Paternal Grandfather     Diabetes Paternal Grandfather     Cancer Paternal Grandfather       Allergies   Allergen Reactions    Biaxin [Clarithromycin] Hives and Itching     Itching      Diflucan [Fluconazole] Hives and Itching     hives    Dilaudid [Hydromorphone Hcl] Other (See Comments)     \"head feels like it's on fire\"    Erythromycin     Eszopiclone Hives    Hydromorphone Other (See Comments)    Terbinafine And Related Hives    Jardiance [Empagliflozin] Other (See Comments)     Yeast infection and UTI    Metformin And Related Itching         This chart was generated using the Dragon dictation system.  I created this record but it may contain dictation errors due to the limitation of the software.    The patient (or guardian, if applicable) and other individuals in attendance with the patient were advised that Artificial Intelligence will be utilized during this visit to record, process the conversation to generate a clinical note, and support improvement of the AI technology. The patient (or guardian, if applicable) and other individuals in attendance at the appointment consented to the use of AI, including the recording.

## 2025-04-06 ENCOUNTER — ANESTHESIA EVENT (OUTPATIENT)
Dept: ENDOSCOPY | Age: 50
End: 2025-04-06
Payer: COMMERCIAL

## 2025-04-07 ENCOUNTER — HOSPITAL ENCOUNTER (OUTPATIENT)
Age: 50
Setting detail: OUTPATIENT SURGERY
Discharge: HOME | End: 2025-04-07
Attending: INTERNAL MEDICINE | Admitting: INTERNAL MEDICINE
Payer: COMMERCIAL

## 2025-04-07 ENCOUNTER — ANESTHESIA (OUTPATIENT)
Dept: ENDOSCOPY | Age: 50
End: 2025-04-07
Payer: COMMERCIAL

## 2025-04-07 ENCOUNTER — TELEPHONE (OUTPATIENT)
Dept: CARDIOLOGY CLINIC | Age: 50
End: 2025-04-07

## 2025-04-07 VITALS
OXYGEN SATURATION: 97 % | HEIGHT: 63 IN | BODY MASS INDEX: 46.95 KG/M2 | TEMPERATURE: 98.1 F | RESPIRATION RATE: 18 BRPM | WEIGHT: 265 LBS | SYSTOLIC BLOOD PRESSURE: 156 MMHG | HEART RATE: 103 BPM | DIASTOLIC BLOOD PRESSURE: 87 MMHG

## 2025-04-07 DIAGNOSIS — K21.9 GASTROESOPHAGEAL REFLUX DISEASE, UNSPECIFIED WHETHER ESOPHAGITIS PRESENT: ICD-10-CM

## 2025-04-07 LAB
GLUCOSE BLD-MCNC: 103 MG/DL (ref 70–99)
GLUCOSE BLD-MCNC: 113 MG/DL (ref 70–99)
PERFORMED ON: ABNORMAL
PERFORMED ON: ABNORMAL

## 2025-04-07 PROCEDURE — 6360000002 HC RX W HCPCS: Performed by: NURSE ANESTHETIST, CERTIFIED REGISTERED

## 2025-04-07 PROCEDURE — 88305 TISSUE EXAM BY PATHOLOGIST: CPT

## 2025-04-07 PROCEDURE — 6370000000 HC RX 637 (ALT 250 FOR IP): Performed by: ANESTHESIOLOGY

## 2025-04-07 PROCEDURE — 7100000010 HC PHASE II RECOVERY - FIRST 15 MIN: Performed by: INTERNAL MEDICINE

## 2025-04-07 PROCEDURE — 3609012400 HC EGD TRANSORAL BIOPSY SINGLE/MULTIPLE: Performed by: INTERNAL MEDICINE

## 2025-04-07 PROCEDURE — 3700000000 HC ANESTHESIA ATTENDED CARE: Performed by: INTERNAL MEDICINE

## 2025-04-07 PROCEDURE — 2580000003 HC RX 258: Performed by: ANESTHESIOLOGY

## 2025-04-07 PROCEDURE — 7100000011 HC PHASE II RECOVERY - ADDTL 15 MIN: Performed by: INTERNAL MEDICINE

## 2025-04-07 PROCEDURE — 2709999900 HC NON-CHARGEABLE SUPPLY: Performed by: INTERNAL MEDICINE

## 2025-04-07 RX ORDER — SODIUM CHLORIDE 0.9 % (FLUSH) 0.9 %
5-40 SYRINGE (ML) INJECTION EVERY 12 HOURS SCHEDULED
Status: DISCONTINUED | OUTPATIENT
Start: 2025-04-07 | End: 2025-04-07 | Stop reason: HOSPADM

## 2025-04-07 RX ORDER — SODIUM CHLORIDE 9 MG/ML
INJECTION, SOLUTION INTRAVENOUS PRN
Status: DISCONTINUED | OUTPATIENT
Start: 2025-04-07 | End: 2025-04-07 | Stop reason: HOSPADM

## 2025-04-07 RX ORDER — PROPOFOL 10 MG/ML
INJECTION, EMULSION INTRAVENOUS
Status: DISCONTINUED | OUTPATIENT
Start: 2025-04-07 | End: 2025-04-07 | Stop reason: SDUPTHER

## 2025-04-07 RX ORDER — NALOXONE HYDROCHLORIDE 0.4 MG/ML
INJECTION, SOLUTION INTRAMUSCULAR; INTRAVENOUS; SUBCUTANEOUS PRN
Status: DISCONTINUED | OUTPATIENT
Start: 2025-04-07 | End: 2025-04-07 | Stop reason: HOSPADM

## 2025-04-07 RX ORDER — ACETAMINOPHEN 325 MG/1
650 TABLET ORAL EVERY 8 HOURS PRN
Status: DISCONTINUED | OUTPATIENT
Start: 2025-04-07 | End: 2025-04-07 | Stop reason: HOSPADM

## 2025-04-07 RX ORDER — ONDANSETRON 2 MG/ML
4 INJECTION INTRAMUSCULAR; INTRAVENOUS EVERY 30 MIN PRN
Status: DISCONTINUED | OUTPATIENT
Start: 2025-04-07 | End: 2025-04-07 | Stop reason: HOSPADM

## 2025-04-07 RX ORDER — SODIUM CHLORIDE, SODIUM LACTATE, POTASSIUM CHLORIDE, CALCIUM CHLORIDE 600; 310; 30; 20 MG/100ML; MG/100ML; MG/100ML; MG/100ML
INJECTION, SOLUTION INTRAVENOUS CONTINUOUS
Status: DISCONTINUED | OUTPATIENT
Start: 2025-04-07 | End: 2025-04-07 | Stop reason: HOSPADM

## 2025-04-07 RX ORDER — LIDOCAINE HYDROCHLORIDE 20 MG/ML
INJECTION, SOLUTION EPIDURAL; INFILTRATION; INTRACAUDAL; PERINEURAL
Status: DISCONTINUED | OUTPATIENT
Start: 2025-04-07 | End: 2025-04-07 | Stop reason: SDUPTHER

## 2025-04-07 RX ORDER — SODIUM CHLORIDE 0.9 % (FLUSH) 0.9 %
5-40 SYRINGE (ML) INJECTION PRN
Status: DISCONTINUED | OUTPATIENT
Start: 2025-04-07 | End: 2025-04-07 | Stop reason: HOSPADM

## 2025-04-07 RX ORDER — LIDOCAINE HYDROCHLORIDE 10 MG/ML
0.3 INJECTION, SOLUTION EPIDURAL; INFILTRATION; INTRACAUDAL; PERINEURAL
Status: DISCONTINUED | OUTPATIENT
Start: 2025-04-07 | End: 2025-04-07 | Stop reason: HOSPADM

## 2025-04-07 RX ADMIN — LIDOCAINE HYDROCHLORIDE 60 MG: 20 INJECTION, SOLUTION EPIDURAL; INFILTRATION; INTRACAUDAL; PERINEURAL at 11:52

## 2025-04-07 RX ADMIN — PROPOFOL 100 MG: 10 INJECTION, EMULSION INTRAVENOUS at 11:52

## 2025-04-07 RX ADMIN — PROPOFOL 100 MG: 10 INJECTION, EMULSION INTRAVENOUS at 11:55

## 2025-04-07 RX ADMIN — SODIUM CHLORIDE, SODIUM LACTATE, POTASSIUM CHLORIDE, AND CALCIUM CHLORIDE: .6; .31; .03; .02 INJECTION, SOLUTION INTRAVENOUS at 11:38

## 2025-04-07 RX ADMIN — ACETAMINOPHEN 650 MG: 325 TABLET ORAL at 12:29

## 2025-04-07 ASSESSMENT — PAIN SCALES - GENERAL
PAINLEVEL_OUTOF10: 0
PAINLEVEL_OUTOF10: 5
PAINLEVEL_OUTOF10: 4
PAINLEVEL_OUTOF10: 5
PAINLEVEL_OUTOF10: 0

## 2025-04-07 ASSESSMENT — LIFESTYLE VARIABLES: SMOKING_STATUS: 1

## 2025-04-07 ASSESSMENT — PAIN DESCRIPTION - DESCRIPTORS
DESCRIPTORS: ACHING

## 2025-04-07 ASSESSMENT — PAIN DESCRIPTION - LOCATION
LOCATION: HEAD

## 2025-04-07 ASSESSMENT — PAIN - FUNCTIONAL ASSESSMENT: PAIN_FUNCTIONAL_ASSESSMENT: 0-10

## 2025-04-07 NOTE — H&P
fatty infiltration of liver. No acute intra-abdominal intrapelvic abnormality.  US liver and Fibroscan 2/1/25 noted fatty liver, fibrosis score F1 (5.37 kPa) and previous cholecystectomy. Negative for cirrhosis.  Labs from 2/1/25 reviewed normal LFTs, HAV IgG negative, HBsAg negative, HBsAb > 1000 (immune), HBcAb, HCV ab negative  Counseled on adherrence to mediterranean diet and exercise to achieve at least 10% body weight loss.  Recommend Hep A vaccination at PCP    1.  Patient is a suitable candidate for endoscopic procedure and attendant anesthesia  2.  Risks, benefits, alternatives of procedure discussed in detail with patient including risks of bleeding, infection, perforation, risks of sedation, risks of missed lesions. The patient wishes to proceed.

## 2025-04-07 NOTE — TELEPHONE ENCOUNTER
Pt stated that she needed to schedule with [facility]. Next available day in June was offered. Pt mentioned that she would like to be seen sooner due to Dr. Coker advising her that she has left hand edema that is pitting. She had a procedure with him on 4/7 for an endoscopy and that's when they noticed her hand and advised her to call the office. Please advise.

## 2025-04-07 NOTE — TELEPHONE ENCOUNTER
Isolated left hand swelling unlikely due to heart  She should start by seeing PCP or go to urgent care  Can schedule next available w/ me in June if she would like

## 2025-04-07 NOTE — ANESTHESIA PRE PROCEDURE
CREATININE 0.6 03/11/2024 02:30 PM    GFRAA >60 09/20/2021 08:18 AM    GFRAA >60 08/24/2012 07:40 AM    AGRATIO 1.3 03/11/2024 02:30 PM    LABGLOM >90 08/15/2024 08:29 AM    LABGLOM >60 03/11/2024 02:30 PM    GLUCOSE 262 03/11/2024 02:30 PM    CALCIUM 9.6 03/11/2024 02:30 PM    BILITOT 0.3 02/01/2025 08:19 AM    ALKPHOS 84 02/01/2025 08:19 AM    AST 17 02/01/2025 08:19 AM    ALT 23 02/01/2025 08:19 AM       POC Tests: No results for input(s): \"POCGLU\", \"POCNA\", \"POCK\", \"POCCL\", \"POCBUN\", \"POCHEMO\", \"POCHCT\" in the last 72 hours.    Coags:   Lab Results   Component Value Date/Time    PROTIME 13.3 07/01/2024 11:02 AM    INR 0.99 07/01/2024 11:02 AM       HCG (If Applicable):   Lab Results   Component Value Date    PREGTESTUR Negative 08/25/2020    PREGSERUM Negative 05/28/2023    HCGQUANT <5.0 11/02/2017        ABGs: No results found for: \"PHART\", \"PO2ART\", \"RXT0KGV\", \"GLN2OXL\", \"BEART\", \"I7TUDSTM\"     Type & Screen (If Applicable):  Lab Results   Component Value Date    ABORH A NEG 08/25/2020    LABANTI NEG 08/25/2020       Drug/Infectious Status (If Applicable):  No results found for: \"HIV\", \"HEPCAB\"    COVID-19 Screening (If Applicable):   Lab Results   Component Value Date/Time    COVID19 NOT DETECTED 12/07/2023 12:17 PM    COVID19 NOT DETECTED 08/19/2020 05:04 PM           Anesthesia Evaluation    Airway: Mallampati: II          Dental: normal exam         Pulmonary: breath sounds clear to auscultation  (+)       decreased breath sounds    current smoker                           Cardiovascular:    (+) valvular problems/murmurs: AS        Rhythm: regular  Rate: normal                    Neuro/Psych:               GI/Hepatic/Renal:             Endo/Other:                     Abdominal:   (+) obese          Vascular:          Other Findings:       Anesthesia Plan      MAC     ASA 3                               Matthew Mckeon MD   4/7/2025

## 2025-04-07 NOTE — TELEPHONE ENCOUNTER
Spoke with patient. [facility] [place] is not like heart related and she should see her PCP. She does robert to schedule her follow up though. Please call her to schedule next available in [place].

## 2025-04-07 NOTE — PROGRESS NOTES
PT c/o headache 5/10  prn tylenol given pt, tolerated diet mountain dew well, discharge instructions reviewed with pt and  Dhruv both parties verbalized understanding, pt ready to discharge home.    
Pre and post operative expectations and routines explained to patient, verbalized understanding.  Oriented pt to call light  (in patients reach) / verbalized understanding /demonstrates use  Bed position is in low   Side rails up X 2 /gurney locked   
Received in PACU. Report received from endo nurse and CRNA. Drowsy but arouses easily to verbal stimuli. Offers no complaints.  
           _______SCD                      ______STEPHON SKINNER    COVID + _______DATE    ___OK per Anesthesia   ____OK per Surgeon        
between now and time of surgery. Cough, cold, fever, sore throat, nausea, vomiting, etc.  Please notify your surgeon if you experience dizziness, shortness of breath or blurred vision between now & the time of your surgery  To provide excellent care visitors will be limited to two per room at any given time. No visitors under the age of 14.  If you use oxygen and have a portable tank please bring it with you the DOS  For your convenience [facility] has a pharmacy on site to fill your prescriptions.     *Please call pre admission testing if you have any further questions             [place]      [phone]                            Address: [address] [place] [address]     When you pull into the hospital and are looking at the main hospital entrance, turn right.   We are a tan building to the right of the main entrance.   [facility] over the door.  .                                                        Revision History     
no

## 2025-04-07 NOTE — TELEPHONE ENCOUNTER
Assessment:   Chest pain - atypical. Prior cardiac evaluation unremarkable. Suspect not cardiac  Reported AF on Apple watch - reviewed: rhythm is NSR  Palpitations - etiology uncertain   CT calcium score 0 in 7/2022  GOMEZ due to smoking and deconditioning   Hyperlipidemia - suboptimal.   Sleep apnea- on CPAP   Fatigue and daytime sleepiness   Smoking - cessation advised   Family history of heart diease- on father and uncle

## 2025-04-07 NOTE — DISCHARGE INSTRUCTIONS
sign in to your [employer] account. Enter J454 in the Search Health Information box to learn more about \"Upper GI Endoscopy: What to Expect at Home.\"     If you do not have an account, please click on the \"Sign Up Now\" link.  Current as of: May 12, 2017  Content Version: 11.6  © 0622-0046 [employer]. Care instructions adapted under license by [employer]. If you have questions about a medical condition or this instruction, always ask your healthcare professional. [employer] disclaims any warranty or liability for your use of this information.

## 2025-04-07 NOTE — ANESTHESIA POSTPROCEDURE EVALUATION
Department of Anesthesiology  Postprocedure Note    Patient: Gómez Sewell  MRN: [id number]  YOB: 1975  Date of evaluation: 4/7/2025    Procedure Summary       Date: 04/07/25 Room / Location: [place] [address] / [facility]    Anesthesia Start: 1149 Anesthesia Stop: 1204    Procedure: ESOPHAGOGASTRODUODENOSCOPY BIOPSY Diagnosis:       Gastroesophageal reflux disease, unspecified whether esophagitis present      (Gastroesophageal reflux disease, unspecified whether esophagitis present [K21.9])    Surgeons: Sonny Byrd MD Responsible Provider: Matthew Mckeon MD    Anesthesia Type: MAC ASA Status: 3            Anesthesia Type: No value filed.    Angélica Phase I: Angélica Score: 10    Angélica Phase II: Angélica Score: 10    Anesthesia Post Evaluation    Patient location during evaluation: PACU  Level of consciousness: awake  Airway patency: patent  Nausea & Vomiting: no vomiting  Cardiovascular status: blood pressure returned to baseline  Respiratory status: acceptable  Hydration status: stable  Pain management: adequate        No notable events documented.

## 2025-04-11 ENCOUNTER — TELEPHONE (OUTPATIENT)
Dept: GYNECOLOGY | Age: 50
End: 2025-04-11

## 2025-04-11 DIAGNOSIS — R10.2 PELVIC PAIN: Primary | ICD-10-CM

## 2025-04-11 NOTE — TELEPHONE ENCOUNTER
Patient is calling to set up an appointment for pelvic pain. Patient say she was scheduled back in March but decided to cancel because she thought she was just constipated but pain hasn't gotten any better    Patient can be reached at [phone]

## 2025-04-11 NOTE — TELEPHONE ENCOUNTER
Yes-place order for a pelvic ultrasound and make sure patient has her annual set up in June. Please check date she needs to come in.

## 2025-04-11 NOTE — TELEPHONE ENCOUNTER
Called and spoke to patient, related message ot he from Dr Gardner. She was already scheduled for her annual exam for 6/12/25. Placed order in system for her to have a pelvic US. She will call and get this scheduled and Dr. Gardner will go from there

## 2025-04-12 ENCOUNTER — HOSPITAL ENCOUNTER (OUTPATIENT)
Dept: ULTRASOUND IMAGING | Age: 50
Discharge: HOME | End: 2025-04-12
Attending: OBSTETRICS & GYNECOLOGY
Payer: COMMERCIAL

## 2025-04-12 DIAGNOSIS — R10.2 PELVIC PAIN: ICD-10-CM

## 2025-04-12 PROCEDURE — 76856 US EXAM PELVIC COMPLETE: CPT

## 2025-04-12 PROCEDURE — 76830 TRANSVAGINAL US NON-OB: CPT

## 2025-04-14 ENCOUNTER — HOSPITAL ENCOUNTER (OUTPATIENT)
Age: 50
Discharge: HOME | End: 2025-04-14
Payer: COMMERCIAL

## 2025-04-14 ENCOUNTER — PATIENT MESSAGE (OUTPATIENT)
Dept: FAMILY MEDICINE CLINIC | Age: 50
End: 2025-04-14

## 2025-04-14 DIAGNOSIS — R10.31 RIGHT LOWER QUADRANT PAIN: Primary | ICD-10-CM

## 2025-04-14 DIAGNOSIS — E11.9 TYPE 2 DIABETES MELLITUS WITHOUT COMPLICATION, WITHOUT LONG-TERM CURRENT USE OF INSULIN: ICD-10-CM

## 2025-04-14 DIAGNOSIS — K59.09 OTHER CONSTIPATION: ICD-10-CM

## 2025-04-14 DIAGNOSIS — G62.9 NEUROPATHY: ICD-10-CM

## 2025-04-14 DIAGNOSIS — I10 HYPERTENSION, UNSPECIFIED TYPE: ICD-10-CM

## 2025-04-14 PROCEDURE — 80048 BASIC METABOLIC PNL TOTAL CA: CPT

## 2025-04-14 PROCEDURE — 36415 COLL VENOUS BLD VENIPUNCTURE: CPT

## 2025-04-14 RX ORDER — GABAPENTIN 100 MG/1
CAPSULE ORAL
Qty: 30 CAPSULE | Refills: 3 | OUTPATIENT
Start: 2025-04-14

## 2025-04-14 RX ORDER — GABAPENTIN 100 MG/1
CAPSULE ORAL
Qty: 60 CAPSULE | Refills: 3 | Status: SHIPPED | OUTPATIENT
Start: 2025-04-14 | End: 2025-05-15

## 2025-04-14 NOTE — TELEPHONE ENCOUNTER
Last Office Visit  -  4/3/25  Next Office Visit  -  n/a    Last Filled  -  12/19/24  Last UDS -    Contract -

## 2025-04-14 NOTE — TELEPHONE ENCOUNTER
Last Office Visit  -  04/03/2025  Next Office Visit  -  04/29/2025    Last Filled  -    Last UDS -    Contract -

## 2025-04-15 ENCOUNTER — RESULTS FOLLOW-UP (OUTPATIENT)
Dept: FAMILY MEDICINE CLINIC | Age: 50
End: 2025-04-15

## 2025-04-15 LAB
ANION GAP SERPL CALCULATED.3IONS-SCNC: 10 MMOL/L (ref 3–16)
BUN SERPL-MCNC: 11 MG/DL (ref 7–20)
CALCIUM SERPL-MCNC: 9 MG/DL (ref 8.3–10.6)
CHLORIDE SERPL-SCNC: 102 MMOL/L (ref 99–110)
CO2 SERPL-SCNC: 24 MMOL/L (ref 21–32)
CREAT SERPL-MCNC: 0.6 MG/DL (ref 0.6–1.1)
GFR SERPLBLD CREATININE-BSD FMLA CKD-EPI: >90 ML/MIN/{1.73_M2}
GLUCOSE SERPL-MCNC: 95 MG/DL (ref 70–99)
POTASSIUM SERPL-SCNC: 4.2 MMOL/L (ref 3.5–5.1)
SODIUM SERPL-SCNC: 136 MMOL/L (ref 136–145)

## 2025-04-21 ENCOUNTER — TELEPHONE (OUTPATIENT)
Dept: ORTHOPEDIC SURGERY | Age: 50
End: 2025-04-21

## 2025-04-22 ENCOUNTER — RESULTS FOLLOW-UP (OUTPATIENT)
Dept: GASTROENTEROLOGY | Age: 50
End: 2025-04-22

## 2025-04-23 ENCOUNTER — RESULTS FOLLOW-UP (OUTPATIENT)
Dept: GYNECOLOGY | Age: 50
End: 2025-04-23

## 2025-04-28 ENCOUNTER — OFFICE VISIT (OUTPATIENT)
Dept: FAMILY MEDICINE CLINIC | Age: 50
End: 2025-04-28
Payer: COMMERCIAL

## 2025-04-28 VITALS
HEIGHT: 63 IN | SYSTOLIC BLOOD PRESSURE: 138 MMHG | WEIGHT: 267 LBS | BODY MASS INDEX: 47.31 KG/M2 | HEART RATE: 83 BPM | OXYGEN SATURATION: 97 % | DIASTOLIC BLOOD PRESSURE: 88 MMHG

## 2025-04-28 DIAGNOSIS — L73.9 HAIR FOLLICLE INFECTION: Primary | ICD-10-CM

## 2025-04-28 PROCEDURE — 99213 OFFICE O/P EST LOW 20 MIN: CPT | Performed by: NURSE PRACTITIONER

## 2025-04-28 RX ORDER — DOXYCYCLINE HYCLATE 100 MG
100 TABLET ORAL 2 TIMES DAILY
Qty: 20 TABLET | Refills: 0 | Status: SHIPPED | OUTPATIENT
Start: 2025-04-28 | End: 2025-05-08

## 2025-04-28 ASSESSMENT — PATIENT HEALTH QUESTIONNAIRE - PHQ9
SUM OF ALL RESPONSES TO PHQ QUESTIONS 1-9: 0
1. LITTLE INTEREST OR PLEASURE IN DOING THINGS: NOT AT ALL
8. MOVING OR SPEAKING SO SLOWLY THAT OTHER PEOPLE COULD HAVE NOTICED. OR THE OPPOSITE, BEING SO FIGETY OR RESTLESS THAT YOU HAVE BEEN MOVING AROUND A LOT MORE THAN USUAL: NOT AT ALL
6. FEELING BAD ABOUT YOURSELF - OR THAT YOU ARE A FAILURE OR HAVE LET YOURSELF OR YOUR FAMILY DOWN: NOT AT ALL
5. POOR APPETITE OR OVEREATING: NOT AT ALL
9. THOUGHTS THAT YOU WOULD BE BETTER OFF DEAD, OR OF HURTING YOURSELF: NOT AT ALL
SUM OF ALL RESPONSES TO PHQ QUESTIONS 1-9: 0
4. FEELING TIRED OR HAVING LITTLE ENERGY: NOT AT ALL
2. FEELING DOWN, DEPRESSED OR HOPELESS: NOT AT ALL
10. IF YOU CHECKED OFF ANY PROBLEMS, HOW DIFFICULT HAVE THESE PROBLEMS MADE IT FOR YOU TO DO YOUR WORK, TAKE CARE OF THINGS AT HOME, OR GET ALONG WITH OTHER PEOPLE: NOT DIFFICULT AT ALL
SUM OF ALL RESPONSES TO PHQ QUESTIONS 1-9: 0
SUM OF ALL RESPONSES TO PHQ QUESTIONS 1-9: 0
7. TROUBLE CONCENTRATING ON THINGS, SUCH AS READING THE NEWSPAPER OR WATCHING TELEVISION: NOT AT ALL
3. TROUBLE FALLING OR STAYING ASLEEP: NOT AT ALL

## 2025-04-28 NOTE — PROGRESS NOTES
Patient: Gómez Sewell is a 49 y.o. female who presents today with the following Chief Complaint(s):  Chief Complaint   Patient presents with    Follow-up     Patient thinks she has pilonidal cyst x 5 days       Assessment/Plan:    Assessment & Plan  1.infected hair follicle/abscess.   Upon examination, the lesion appears to be more consistent with an infected hair follicle rather than a typical pilonidal cyst. She has been advised to apply warm, moist compresses to the affected area several times daily to facilitate the drainage of any potential infection. Additionally, she has been instructed to soak in the tub or use a warm, moist washcloth on the area. A prescription for doxycycline 100 mg, to be taken twice daily for a duration of 10 days, has been provided and sent to [facility]. If the condition worsens or the lesion enlarges, she is to inform us immediately. In such a case, a referral to a colorectal specialist may be considered.       1. Hair follicle infection  Treat with antibiotic and warm moist compresses and follow up if no improvement.   - doxycycline hyclate (VIBRA-TABS) 100 MG tablet; Take 1 tablet by mouth 2 times daily for 10 days  Dispense: 20 tablet; Refill: 0            HPI:     History of Present Illness  The patient presents for evaluation of a suspected pilonidal cyst.    She reports the presence of a lesion, which she believes to be a pilonidal cyst, persisting for approximately 5 days. This is her first experience with such a condition. The lesion is not exhibiting any drainage. She has not attempted any home remedies such as warm compresses. She does not perceive any increase in the size of the lesion. She expresses concern about the potential need for surgical intervention, given her daughter-in-law's history of pilonidal surgery. Her current management strategy involves maintaining cleanliness with wet wipes during bathroom visits and ensuring the area remains dry.    Supplemental

## 2025-04-29 ASSESSMENT — ENCOUNTER SYMPTOMS
RESPIRATORY NEGATIVE: 1
GASTROINTESTINAL NEGATIVE: 1

## 2025-05-06 RX ORDER — SEMAGLUTIDE 0.68 MG/ML
INJECTION, SOLUTION SUBCUTANEOUS
Qty: 3 ML | Refills: 2 | Status: SHIPPED | OUTPATIENT
Start: 2025-05-06

## 2025-05-19 ENCOUNTER — TELEPHONE (OUTPATIENT)
Dept: ADMINISTRATIVE | Age: 50
End: 2025-05-19

## 2025-05-19 DIAGNOSIS — G47.00 INSOMNIA, UNSPECIFIED TYPE: ICD-10-CM

## 2025-05-19 RX ORDER — ATORVASTATIN CALCIUM 40 MG/1
40 TABLET, FILM COATED ORAL DAILY
Qty: 90 TABLET | Refills: 3 | Status: SHIPPED | OUTPATIENT
Start: 2025-05-19

## 2025-05-19 RX ORDER — TRAZODONE HYDROCHLORIDE 50 MG/1
50 TABLET ORAL NIGHTLY
Qty: 30 TABLET | Refills: 5 | Status: SHIPPED | OUTPATIENT
Start: 2025-05-19

## 2025-05-19 NOTE — TELEPHONE ENCOUNTER
Submitted PA for Ozempic (0.25 or 0.5 MG/DOSE) 2MG/3ML pen-injectors  Via CMM Key: [id number] STATUS: PENDING.    Follow up done daily; if no decision with in three days we will refax.  If another three days goes by with no decision will call the insurance for status.

## 2025-05-21 NOTE — TELEPHONE ENCOUNTER
I called the pt. She stated she did not know why we did the PA she has more pens at home and does not need any more right now. I did let her know that when she is needing a refill to please reach out to us or the pharmacy.

## 2025-05-21 NOTE — TELEPHONE ENCOUNTER
Per plan - Member has an active PA on file which is expiring on 05/28/2025.     Please notify patient. Thank you.

## 2025-05-28 ENCOUNTER — TELEPHONE (OUTPATIENT)
Dept: GYNECOLOGY | Age: 50
End: 2025-05-28

## 2025-05-28 RX ORDER — PROGESTERONE 200 MG/1
200 CAPSULE ORAL DAILY
Qty: 90 CAPSULE | Refills: 3 | Status: SHIPPED | OUTPATIENT
Start: 2025-05-28

## 2025-05-28 NOTE — TELEPHONE ENCOUNTER
Called and spoke to patient rescheduled her from 6/12/25 4:40 to July 22nd at 4:40 pm. Pt needed a 4:40 appointment she also says she will need more progesterone she will be running out by the time of her appointment.

## 2025-05-29 ENCOUNTER — OFFICE VISIT (OUTPATIENT)
Dept: FAMILY MEDICINE CLINIC | Age: 50
End: 2025-05-29
Payer: COMMERCIAL

## 2025-05-29 VITALS
HEIGHT: 63 IN | HEART RATE: 91 BPM | SYSTOLIC BLOOD PRESSURE: 172 MMHG | BODY MASS INDEX: 46.95 KG/M2 | WEIGHT: 265 LBS | DIASTOLIC BLOOD PRESSURE: 92 MMHG | OXYGEN SATURATION: 95 %

## 2025-05-29 DIAGNOSIS — E78.2 MIXED HYPERLIPIDEMIA: ICD-10-CM

## 2025-05-29 DIAGNOSIS — M79.675 PAIN OF TOE OF LEFT FOOT: Primary | ICD-10-CM

## 2025-05-29 DIAGNOSIS — E11.40 TYPE 2 DIABETES MELLITUS WITH DIABETIC NEUROPATHY, WITHOUT LONG-TERM CURRENT USE OF INSULIN (HCC): ICD-10-CM

## 2025-05-29 PROBLEM — F33.1 MAJOR DEPRESSIVE DISORDER, RECURRENT, MODERATE (HCC): Status: RESOLVED | Noted: 2024-06-11 | Resolved: 2025-05-29

## 2025-05-29 PROCEDURE — 3044F HG A1C LEVEL LT 7.0%: CPT | Performed by: STUDENT IN AN ORGANIZED HEALTH CARE EDUCATION/TRAINING PROGRAM

## 2025-05-29 PROCEDURE — 99214 OFFICE O/P EST MOD 30 MIN: CPT | Performed by: STUDENT IN AN ORGANIZED HEALTH CARE EDUCATION/TRAINING PROGRAM

## 2025-05-29 NOTE — ASSESSMENT & PLAN NOTE
Chronic, at goal (stable), continue current treatment plan    Orders:    James Young DPM, Podiatry, [place]    XR FOOT LEFT (MIN 3 VIEWS); Future     Otitis Media, Adult  Otitis media is redness, soreness, and inflammation of the middle ear. Otitis media may be caused by allergies or, most commonly, by infection. Often it occurs as a complication of the common cold.  SIGNS AND SYMPTOMS  Symptoms of otitis media may include:  · Earache.  · Fever.  · Ringing in your ear.  · Headache.  · Leakage of fluid from the ear.  DIAGNOSIS  To diagnose otitis media, your health care provider will examine your ear with an otoscope. This is an instrument that allows your health care provider to see into your ear in order to examine your eardrum. Your health care provider also will ask you questions about your symptoms.  TREATMENT   Typically, otitis media resolves on its own within 3-5 days. Your health care provider may prescribe medicine to ease your symptoms of pain. If otitis media does not resolve within 5 days or is recurrent, your health care provider may prescribe antibiotic medicines if he or she suspects that a bacterial infection is the cause.  HOME CARE INSTRUCTIONS   · If you were prescribed an antibiotic medicine, finish it all even if you start to feel better.  · Take medicines only as directed by your health care provider.  · Keep all follow-up visits as directed by your health care provider.  SEEK MEDICAL CARE IF:  · You have otitis media only in one ear, or bleeding from your nose, or both.  · You notice a lump on your neck.  · You are not getting better in 3-5 days.  · You feel worse instead of better.  SEEK IMMEDIATE MEDICAL CARE IF:   · You have pain that is not controlled with medicine.  · You have swelling, redness, or pain around your ear or stiffness in your neck.  · You notice that part of your face is paralyzed.  · You notice that the bone behind your ear (mastoid) is tender when you touch it.  MAKE SURE YOU:   · Understand these instructions.  · Will watch your condition.  · Will get help right away if you are not doing well or get worse.     This  information is not intended to replace advice given to you by your health care provider. Make sure you discuss any questions you have with your health care provider.     Document Released: 09/22/2005 Document Revised: 01/08/2016 Document Reviewed: 07/15/2014  [employer] Interactive Patient Education ©2016 [employer]  Sinusitis, Adult  Sinusitis is redness, soreness, and inflammation of the paranasal sinuses. Paranasal sinuses are air pockets within the bones of your face. They are located beneath your eyes, in the middle of your forehead, and above your eyes. In healthy paranasal sinuses, mucus is able to drain out, and air is able to circulate through them by way of your nose. However, when your paranasal sinuses are inflamed, mucus and air can become trapped. This can allow bacteria and other germs to grow and cause infection.  Sinusitis can develop quickly and last only a short time (acute) or continue over a long period (chronic). Sinusitis that lasts for more than 12 weeks is considered chronic.  CAUSES  Causes of sinusitis include:  · Allergies.  · Structural abnormalities, such as displacement of the cartilage that separates your nostrils (deviated septum), which can decrease the air flow through your nose and sinuses and affect sinus drainage.  · Functional abnormalities, such as when the small hairs (cilia) that line your sinuses and help remove mucus do not work properly or are not present.  SIGNS AND SYMPTOMS  Symptoms of acute and chronic sinusitis are the same. The primary symptoms are pain and pressure around the affected sinuses. Other symptoms include:  · Upper toothache.  · Earache.  · Headache.  · Bad breath.  · Decreased sense of smell and taste.  · A cough, which worsens when you are lying flat.  · Fatigue.  · Fever.  · Thick drainage from your nose, which often is green and may contain pus (purulent).  · Swelling and warmth over the affected sinuses.  DIAGNOSIS  Your health care provider will  perform a physical exam. During your exam, your health care provider may perform any of the following to help determine if you have acute sinusitis or chronic sinusitis:  · Look in your nose for signs of abnormal growths in your nostrils (nasal polyps).  · Tap over the affected sinus to check for signs of infection.  · View the inside of your sinuses using an imaging device that has a light attached (endoscope).  If your health care provider suspects that you have chronic sinusitis, one or more of the following tests may be recommended:  · Allergy tests.  · Nasal culture. A sample of mucus is taken from your nose, sent to a lab, and screened for bacteria.  · Nasal cytology. A sample of mucus is taken from your nose and examined by your health care provider to determine if your sinusitis is related to an allergy.  TREATMENT  Most cases of acute sinusitis are related to a viral infection and will resolve on their own within 10 days. Sometimes, medicines are prescribed to help relieve symptoms of both acute and chronic sinusitis. These may include pain medicines, decongestants, nasal steroid sprays, or saline sprays.  However, for sinusitis related to a bacterial infection, your health care provider will prescribe antibiotic medicines. These are medicines that will help kill the bacteria causing the infection.  Rarely, sinusitis is caused by a fungal infection. In these cases, your health care provider will prescribe antifungal medicine.  For some cases of chronic sinusitis, surgery is needed. Generally, these are cases in which sinusitis recurs more than 3 times per year, despite other treatments.  HOME CARE INSTRUCTIONS  · Drink plenty of water. Water helps thin the mucus so your sinuses can drain more easily.  · Use a humidifier.  · Inhale steam 3-4 times a day (for example, sit in the bathroom with the shower running).  · Apply a warm, moist washcloth to your face 3-4 times a day, or as directed by your health care  provider.  · Use saline nasal sprays to help moisten and clean your sinuses.  · Take medicines only as directed by your health care provider.  · If you were prescribed either an antibiotic or antifungal medicine, finish it all even if you start to feel better.  SEEK IMMEDIATE MEDICAL CARE IF:  · You have increasing pain or severe headaches.  · You have nausea, vomiting, or drowsiness.  · You have swelling around your face.  · You have vision problems.  · You have a stiff neck.  · You have difficulty breathing.     This information is not intended to replace advice given to you by your health care provider. Make sure you discuss any questions you have with your health care provider.     Document Released: 12/18/2006 Document Revised: 01/08/2016 Document Reviewed: 10/12/2016  [employer] Interactive Patient Education ©2016 [employer]

## 2025-05-29 NOTE — PROGRESS NOTES
Gómez Sewell (:  1975) is a 49 y.o. female,Established patient, here for evaluation of the following chief complaint(s):  Toe Pain (Pain in left great toe and pain on bottom of left foot. Pt states foot is mostly numb due to neuropathy. 1 week duration. Walking makes pain worse. Taking tylenol)         Assessment & Plan  Pain of toe of left foot    Acute.  Unclear cause.  I am encouraged the patient is not having fever, erythema, edema, but given history of severe peripheral neuropathy, patient should follow-up with a podiatrist.  Will also obtain x-ray to verify that there is not more insidious going on.    Orders:    XR FOOT LEFT (MIN 3 VIEWS); Future    Type 2 diabetes mellitus with diabetic neuropathy, without long-term current use of insulin (HCC)   Chronic, at goal (stable), continue current treatment plan    Orders:    James Young DPM, Podiatry, [place]    XR FOOT LEFT (MIN 3 VIEWS); Future    Mixed hyperlipidemia   Chronic, at goal (stable), continue current treatment plan           No follow-ups on file.       Subjective   HPI  Patient is a 49-year-old female, who presents to clinic for foot pain.  It is of note that the patient is a type II diabetic with rather severe peripheral neuropathy.  Patient states that it is concerning when she has pain in her feet, because she does not often feel her feet.  She states that she has had pain on the top and underneath of her great toe on the left-hand side for the past few days.  She has not noticed any change in swelling, changes in color, change in temperature of her extremities.  She does not have any calluses that are forming or ulcers that she has noticed on her feet either.  She does not follow with a podiatrist.  She has not had any fevers or noted any purulent discharge from her foot.  Does have very flat feet and has had neuropathy for a long time.    Patient has chronic type 2 diabetes.  She is on semaglutide for this.  This

## 2025-06-02 NOTE — TELEPHONE ENCOUNTER
Submitted PA for Ozempic (0.25 or 0.5 MG/DOSE) 2MG/3ML pen-injectors  Via CMM Key: [id number] STATUS: Member should be able to get the drug/product without a PA at this time.     Please notify patient. Thank you.

## 2025-06-07 ENCOUNTER — HOSPITAL ENCOUNTER (OUTPATIENT)
Dept: CT IMAGING | Age: 50
Discharge: HOME | End: 2025-06-07
Attending: OBSTETRICS & GYNECOLOGY
Payer: COMMERCIAL

## 2025-06-07 DIAGNOSIS — K59.09 OTHER CONSTIPATION: ICD-10-CM

## 2025-06-07 DIAGNOSIS — R10.31 RIGHT LOWER QUADRANT PAIN: ICD-10-CM

## 2025-06-07 LAB
PERFORMED ON: NORMAL
POC CREATININE: 0.6 MG/DL (ref 0.6–1.1)
POC SAMPLE TYPE: NORMAL

## 2025-06-07 PROCEDURE — 6360000004 HC RX CONTRAST MEDICATION: Performed by: OBSTETRICS & GYNECOLOGY

## 2025-06-07 PROCEDURE — 74177 CT ABD & PELVIS W/CONTRAST: CPT

## 2025-06-07 PROCEDURE — 82565 ASSAY OF CREATININE: CPT

## 2025-06-07 RX ORDER — IOPAMIDOL 755 MG/ML
75 INJECTION, SOLUTION INTRAVASCULAR
Status: COMPLETED | OUTPATIENT
Start: 2025-06-07 | End: 2025-06-07

## 2025-06-07 RX ORDER — DIATRIZOATE MEGLUMINE AND DIATRIZOATE SODIUM 660; 100 MG/ML; MG/ML
12 SOLUTION ORAL; RECTAL
Status: DISCONTINUED | OUTPATIENT
Start: 2025-06-07 | End: 2025-06-08 | Stop reason: HOSPADM

## 2025-06-07 RX ADMIN — IOPAMIDOL 75 ML: 755 INJECTION, SOLUTION INTRAVENOUS at 10:20

## 2025-06-07 RX ADMIN — DIATRIZOATE MEGLUMINE AND DIATRIZOATE SODIUM 12 ML: 660; 100 LIQUID ORAL; RECTAL at 10:20

## 2025-06-09 ENCOUNTER — OFFICE VISIT (OUTPATIENT)
Dept: FAMILY MEDICINE CLINIC | Age: 50
End: 2025-06-09
Payer: COMMERCIAL

## 2025-06-09 VITALS
OXYGEN SATURATION: 97 % | WEIGHT: 267 LBS | SYSTOLIC BLOOD PRESSURE: 160 MMHG | DIASTOLIC BLOOD PRESSURE: 88 MMHG | BODY MASS INDEX: 47.3 KG/M2 | HEART RATE: 75 BPM

## 2025-06-09 DIAGNOSIS — G62.9 NEUROPATHY: Primary | ICD-10-CM

## 2025-06-09 DIAGNOSIS — I10 HYPERTENSION, UNSPECIFIED TYPE: ICD-10-CM

## 2025-06-09 PROCEDURE — 99214 OFFICE O/P EST MOD 30 MIN: CPT | Performed by: NURSE PRACTITIONER

## 2025-06-09 PROCEDURE — 3079F DIAST BP 80-89 MM HG: CPT | Performed by: NURSE PRACTITIONER

## 2025-06-09 PROCEDURE — 3077F SYST BP >= 140 MM HG: CPT | Performed by: NURSE PRACTITIONER

## 2025-06-09 RX ORDER — GABAPENTIN 100 MG/1
CAPSULE ORAL
Qty: 120 CAPSULE | Refills: 3 | Status: SHIPPED | OUTPATIENT
Start: 2025-06-09 | End: 2025-07-10

## 2025-06-09 NOTE — PROGRESS NOTES
Maternal Grandmother     Stroke Maternal Grandmother     Diabetes Maternal Grandmother     Cancer Maternal Grandmother     Glaucoma Maternal Grandmother     High Blood Pressure Maternal Grandfather     Elevated Lipids Maternal Grandfather     Stroke Maternal Grandfather     Diabetes Maternal Grandfather     Cancer Maternal Grandfather     High Blood Pressure Paternal Grandmother     Elevated Lipids Paternal Grandmother     Stroke Paternal Grandmother     Diabetes Paternal Grandmother     Cancer Paternal Grandmother     High Blood Pressure Paternal Grandfather     Elevated Lipids Paternal Grandfather     Stroke Paternal Grandfather     Diabetes Paternal Grandfather     Cancer Paternal Grandfather       Allergies   Allergen Reactions    Biaxin [Clarithromycin] Hives and Itching     Itching      Diflucan [Fluconazole] Hives and Itching     hives    Dilaudid [Hydromorphone Hcl] Other (See Comments)     \"head feels like it's on fire\"    Erythromycin     Eszopiclone Hives    Hydromorphone Other (See Comments)    Terbinafine And Related Hives    Jardiance [Empagliflozin] Other (See Comments)     Yeast infection and UTI    Metformin And Related Itching         This chart was generated using the Dragon dictation system.  I created this record but it may contain dictation errors due to the limitation of the software.    The patient (or guardian, if applicable) and other individuals in attendance with the patient were advised that Artificial Intelligence will be utilized during this visit to record, process the conversation to generate a clinical note, and support improvement of the AI technology. The patient (or guardian, if applicable) and other individuals in attendance at the appointment consented to the use of AI, including the recording.

## 2025-06-10 ENCOUNTER — PATIENT MESSAGE (OUTPATIENT)
Dept: NEUROLOGY | Age: 50
End: 2025-06-10

## 2025-06-10 ASSESSMENT — ENCOUNTER SYMPTOMS
RESPIRATORY NEGATIVE: 1
GASTROINTESTINAL NEGATIVE: 1

## 2025-06-12 ENCOUNTER — OFFICE VISIT (OUTPATIENT)
Dept: NEUROLOGY | Age: 50
End: 2025-06-12

## 2025-06-12 VITALS
SYSTOLIC BLOOD PRESSURE: 132 MMHG | HEART RATE: 89 BPM | HEIGHT: 63 IN | OXYGEN SATURATION: 97 % | RESPIRATION RATE: 16 BRPM | BODY MASS INDEX: 47.3 KG/M2 | DIASTOLIC BLOOD PRESSURE: 74 MMHG

## 2025-06-12 DIAGNOSIS — M54.81 BILATERAL OCCIPITAL NEURALGIA: Primary | ICD-10-CM

## 2025-06-12 RX ORDER — LIDOCAINE HYDROCHLORIDE 20 MG/ML
8 INJECTION, SOLUTION INFILTRATION; PERINEURAL ONCE
Status: DISCONTINUED | OUTPATIENT
Start: 2025-06-12 | End: 2025-06-12

## 2025-06-12 RX ORDER — LIDOCAINE HYDROCHLORIDE 20 MG/ML
5 INJECTION, SOLUTION INFILTRATION; PERINEURAL ONCE
Status: COMPLETED | OUTPATIENT
Start: 2025-06-12 | End: 2025-06-12

## 2025-06-12 RX ADMIN — LIDOCAINE HYDROCHLORIDE 5 ML: 20 INJECTION, SOLUTION INFILTRATION; PERINEURAL at 13:24

## 2025-06-12 RX ADMIN — LIDOCAINE HYDROCHLORIDE 5 ML: 20 INJECTION, SOLUTION INFILTRATION; PERINEURAL at 13:25

## 2025-06-12 NOTE — PROGRESS NOTES
History of Present Illness  Follow up visit.     She has been under the care of Dr. Aguiar, receiving regular nerve blocks. The first block provided relief for 5 months, while the second lasted 4 months. The most recent block was administered in 02/2025. She is seeking to manage her pain without resorting to opioids and is currently under the care of a pain specialist. She also reports a history of herniated discs in her neck and radiculopathy from her back. She has not received an epidural in her neck and has found physical therapy to be beneficial. She has undergone an epidural procedure on her back. She has attempted to alleviate her pain through massage, heat application, and physical therapy exercises at home, although she is cautious about overexertion due to fear of exacerbating her condition. She also takes Tylenol and recently increased her gabapentin dosage to 1 in the morning and 3 at night.    Her headaches are bilateral, originating at the base of the skull and radiating upwards, with the left side being more severe. The pain is constant, akin to a toothache, and is triggered by lying on pillows. She works from home and spends most of her day sitting, so she makes an effort to change positions frequently and walk around. She also rotates her head frequently. She has found that heat application significantly alleviates her headache. She has undergone imaging studies, which revealed an artifact. A lumbar puncture was performed by Dr. Aguiar, which returned negative results. She has received occipital nerve injections on both sides, with the first injection administered on the left side.    She has small fiber diabetic neuropathy. She reports that Cymbalta is effective in managing her foot pain, but it does not help with her headaches.    SOCIAL HISTORY:    Occupations: Works from home    /74   Pulse 89   Resp 16   Ht 1.6 m (5' 3\")   LMP  (LMP Unknown)   SpO2 97% Comment: RA  BMI 47.30 kg/m²

## 2025-06-12 NOTE — PROGRESS NOTES
Procedure    Occipital nerve block    Risk versus benefit of procedure were reviewed with the patient.  Informed consent was signed.    Lidocaine 2%    Skin was cleansed with isopropyl alcohol.    Right greater occipital nerve at nuchal ridge = 4ml  Left greater occipital nerve at nuchal ridge = 4ml    Procedure was tolerated without difficulty.  No swelling bruising or bleeding was noted.    Patient knows to call should they have any concerns.

## 2025-06-12 NOTE — PATIENT INSTRUCTIONS
YOU MUST CONFIRM YOUR APPOINTMENT 1 DAY PRIOR OR IT WILL BE CANCELLED!!   Our office will call you 3 times the day prior to your appointment in an attempt to confirm.  Please return our call ASAP or confirm your appt through [employer] no later than 3 pm the day before your appointment.  If we do not hear back from you by 3 pm to confirm, your appointment will be cancelled & someone will be added into that slot from our wait list.

## 2025-06-15 ENCOUNTER — RESULTS FOLLOW-UP (OUTPATIENT)
Dept: GYNECOLOGY | Age: 50
End: 2025-06-15

## 2025-06-16 RX ORDER — PROGESTERONE 200 MG/1
200 CAPSULE ORAL DAILY
Qty: 90 CAPSULE | Refills: 3 | Status: SHIPPED | OUTPATIENT
Start: 2025-06-16

## 2025-06-16 RX ORDER — PROGESTERONE 200 MG/1
200 CAPSULE ORAL DAILY
Qty: 90 CAPSULE | Refills: 3 | Status: SHIPPED | OUTPATIENT
Start: 2025-06-16 | End: 2025-06-16 | Stop reason: ALTCHOICE

## 2025-06-24 ENCOUNTER — OFFICE VISIT (OUTPATIENT)
Dept: CARDIOLOGY CLINIC | Age: 50
End: 2025-06-24
Payer: COMMERCIAL

## 2025-06-24 VITALS
OXYGEN SATURATION: 96 % | SYSTOLIC BLOOD PRESSURE: 182 MMHG | HEIGHT: 63 IN | DIASTOLIC BLOOD PRESSURE: 86 MMHG | WEIGHT: 265.5 LBS | BODY MASS INDEX: 47.04 KG/M2 | HEART RATE: 94 BPM

## 2025-06-24 DIAGNOSIS — R00.2 PALPITATIONS: Primary | ICD-10-CM

## 2025-06-24 DIAGNOSIS — I34.0 NONRHEUMATIC MITRAL VALVE REGURGITATION: ICD-10-CM

## 2025-06-24 PROCEDURE — 99214 OFFICE O/P EST MOD 30 MIN: CPT | Performed by: INTERNAL MEDICINE

## 2025-06-24 PROCEDURE — 93000 ELECTROCARDIOGRAM COMPLETE: CPT | Performed by: INTERNAL MEDICINE

## 2025-06-24 NOTE — PROGRESS NOTES
[facility]   Cardiac Consultation    Referring Provider:  Rach Pritchard APRN - CNP     No chief complaint on file.     Gómez Sewell   1975     History of Present Illness:    Gómez Sewell is a 49 y.o. female who is here today for ER follow up for chest pain and due to Apple watch showing atrial fibrillation. She was initially seen as a new patient in 2022 for chest pain. She has a past medical history of hyperlipidemia, and sleep apnea. She had a normal stress test in 2011. She had an echocardiogram in 2017 which showed an EF of 55%. History of gastric sleeve surgery.   Last OV- she states she was seen by cardiology back in 2018 due to an abnormal heart rhythm while having her sleep study. She states she has been having chest pain on and off for years. Worse pain over the last few months. Pain is now coming on daily now, non radiating. She states she also has palpitations and SOB associated with chest pain. Palpitations feels like a flip flip in her chest. Chest pain comes on randomly at times but also with activity like stairs. Pain feels like a tightness and resolves after a few minutes of rest. Has assoc SOB. Her main compliant is fatigue and daytime sleepiness. She wears CPAP nightly. Overall feels better after she takes a nap. Patient currently denies any weight gain, edema, dizziness, and syncope.   Testing ordered at last visit- she had a normal stress echocardiogram 6/2022-, EF >65%. CT calcium score 7/2022 was 0.    She presented to the ER 3/11/2024 with complaints of chest pain and concerns for AF on apple watch. EKG showed ST, rate 108. Troponin negative.    Today she states she started to have chest pain around one month ago. She states she obtained a smart watch which gave her an AF alert. She has palpitations which started years ago. She states her chest pain is  coming on daily and last most of the day. She described her pain as a dull ache that starts on the left and radiates 
on being more active. Her blood pressure at home is 139/80's. States her pressure is always higher at OV's. PCP recently increased Lisinopril 2 months ago. Patient currently denies any weight gain, palpitations, chest pain, shortness of breath, dizziness, and syncope.      Past Medical History:   has a past medical history of Anesthesia, Arthritis, Bicuspid cardiac valve, Depression, Diabetic neuropathy (HCC), Dysplasia of cervix, Fatty liver, Gastroesophageal reflux disease, Hearing aid worn, HPV (human papilloma virus) anogenital infection, HTN (hypertension), Hyperlipidemia, Obesity, Class III, BMI 40-49.9 (morbid obesity) (HCC), LM (obstructive sleep apnea), Palpitations, Prolonged emergence from general anesthesia, and Type 2 diabetes mellitus (HCC).    Surgical History:   has a past surgical history that includes Cholecystectomy; Tympanostomy tube placement (x2); sinus surgery;  section; Tubal ligation; Dilation and curettage of uterus; Endometrial ablation; LEEP (2015, 3/2017); Breast biopsy (Left, x3); Hysterectomy, total abdominal (N/A, 2017); Upper gastrointestinal endoscopy (N/A, 2020); Sleeve Gastrectomy (N/A, 2020); hiatal hernia repair (N/A, 2020); hernia repair (N/A, 2021); and Upper gastrointestinal endoscopy (N/A, 2025).     Social History:   reports that she has been smoking cigarettes. She started smoking about 30 years ago. She has a 28.9 pack-year smoking history. She has never used smokeless tobacco. She reports that she does not currently use alcohol. She reports that she does not use drugs.     Family History:  family history includes Breast Cancer in her maternal grandmother; Cancer in her maternal grandfather, maternal grandmother, paternal grandfather, and paternal grandmother; Cancer (age of onset: 60) in her mother; Depression in her mother; Diabetes in her maternal grandfather, maternal grandmother, mother, paternal grandfather, and paternal

## 2025-06-24 NOTE — PATIENT INSTRUCTIONS
~Echocardiogram    ~Call [facility] scheduling at [phone] to schedule testing    ~Monitor blood pressure and heart rate at home daily    ~Goal blood pressure at rest is 120-130/70/80's, rarely over 140/90.      Cardiac medications reviewed including indications and pertinent side effects. Medication list updated at this visit.   Patient verbalizes understanding of the need for treatment and education has been provided at today's visit. Additional education material will be provided in after visit summary.    Check blood pressure and heart rate at home a few times per week- keep a log with dates and times and bring to office visit   Regular exercise and following a healthy diet encouraged   Follow up with me based on testing

## 2025-07-08 ENCOUNTER — OFFICE VISIT (OUTPATIENT)
Dept: FAMILY MEDICINE CLINIC | Age: 50
End: 2025-07-08
Payer: COMMERCIAL

## 2025-07-08 VITALS
HEART RATE: 87 BPM | WEIGHT: 267.8 LBS | SYSTOLIC BLOOD PRESSURE: 168 MMHG | BODY MASS INDEX: 47.45 KG/M2 | OXYGEN SATURATION: 98 % | HEIGHT: 63 IN | DIASTOLIC BLOOD PRESSURE: 90 MMHG

## 2025-07-08 DIAGNOSIS — I10 HYPERTENSION, UNSPECIFIED TYPE: Primary | ICD-10-CM

## 2025-07-08 DIAGNOSIS — E11.9 TYPE 2 DIABETES MELLITUS WITHOUT COMPLICATION, WITHOUT LONG-TERM CURRENT USE OF INSULIN (HCC): ICD-10-CM

## 2025-07-08 DIAGNOSIS — M20.20 HALLUX RIGIDUS, UNSPECIFIED LATERALITY: ICD-10-CM

## 2025-07-08 PROCEDURE — 3077F SYST BP >= 140 MM HG: CPT | Performed by: NURSE PRACTITIONER

## 2025-07-08 PROCEDURE — 3044F HG A1C LEVEL LT 7.0%: CPT | Performed by: NURSE PRACTITIONER

## 2025-07-08 PROCEDURE — 99214 OFFICE O/P EST MOD 30 MIN: CPT | Performed by: NURSE PRACTITIONER

## 2025-07-08 PROCEDURE — 3078F DIAST BP <80 MM HG: CPT | Performed by: NURSE PRACTITIONER

## 2025-07-08 RX ORDER — AMLODIPINE BESYLATE 5 MG/1
5 TABLET ORAL DAILY
Qty: 30 TABLET | Refills: 2 | Status: SHIPPED | OUTPATIENT
Start: 2025-07-08 | End: 2025-07-08

## 2025-07-08 RX ORDER — AMLODIPINE BESYLATE 5 MG/1
5 TABLET ORAL DAILY
Qty: 30 TABLET | Refills: 2 | Status: SHIPPED | OUTPATIENT
Start: 2025-07-08

## 2025-07-08 ASSESSMENT — ENCOUNTER SYMPTOMS
GASTROINTESTINAL NEGATIVE: 1
RESPIRATORY NEGATIVE: 1

## 2025-07-08 NOTE — PROGRESS NOTES
anesthesia     Type 2 diabetes mellitus (HCC) 2024      Past Surgical History:   Procedure Laterality Date    BREAST BIOPSY Left x3    benign lumpectomies      SECTION      CHOLECYSTECTOMY      DILATION AND CURETTAGE OF UTERUS      ENDOMETRIAL ABLATION      HERNIA REPAIR N/A 2021    ROBOTIC RECURRENT INCARCERATED INCISIONAL HERNIA REPAIR, WITH MESH, ROBOTIC LYSIS OF ADHESIONS performed by Gavino Rojas DO at [facility] OR    HIATAL HERNIA REPAIR N/A 2020    LAPAROSCOPIC INCARCERATED INCISIONAL HERNIA REPAIR performed by Gavino Rojas DO at [facility] OR    HYSTERECTOMY, TOTAL ABDOMINAL (CERVIX REMOVED) N/A 2017    still has ovaries per patient    LEEP  , 3/2017    SINUS SURGERY      SLEEVE GASTRECTOMY N/A 2020    ROBOTIC SLEEVE GASTRECTOMY performed by Gavino Rojas DO at [facility] OR    TUBAL LIGATION      TYMPANOSTOMY TUBE PLACEMENT  x2    UPPER GASTROINTESTINAL ENDOSCOPY N/A 2020    EGD BIOPSY performed by Gavino Rojas DO at [facility] ENDOSCOPY    UPPER GASTROINTESTINAL ENDOSCOPY N/A 2025    ESOPHAGOGASTRODUODENOSCOPY BIOPSY performed by Sonny Byrd MD at [facility] ENDOSCOPY       Family History   Problem Relation Age of Onset    Diabetes Mother     High Blood Pressure Mother     Cancer Mother 60        lung    Elevated Lipids Mother     Heart Disease Mother     Stroke Mother     Depression Mother     Heart Attack Mother     High Blood Pressure Father     Elevated Lipids Father     Heart Disease Father     Breast Cancer Maternal Grandmother     High Blood Pressure Maternal Grandmother     Elevated Lipids Maternal Grandmother     Stroke Maternal Grandmother     Diabetes Maternal Grandmother     Cancer Maternal Grandmother     Glaucoma Maternal Grandmother     High Blood Pressure Maternal Grandfather     Elevated Lipids Maternal Grandfather     Stroke Maternal Grandfather     Diabetes Maternal Grandfather     Cancer Maternal Grandfather     High Blood Pressure Paternal Grandmother

## 2025-07-09 LAB
CREAT UR-MCNC: 324 MG/DL (ref 28–259)
MICROALBUMIN UR DL<=1MG/L-MCNC: 3.39 MG/DL
MICROALBUMIN/CREAT UR: 10.5 MG/G (ref 0–30)

## 2025-07-24 ENCOUNTER — HOSPITAL ENCOUNTER (OUTPATIENT)
Dept: CARDIOLOGY | Age: 50
Discharge: HOME | End: 2025-07-26
Attending: INTERNAL MEDICINE
Payer: COMMERCIAL

## 2025-07-24 VITALS
WEIGHT: 265 LBS | HEIGHT: 63 IN | SYSTOLIC BLOOD PRESSURE: 182 MMHG | DIASTOLIC BLOOD PRESSURE: 86 MMHG | BODY MASS INDEX: 46.95 KG/M2

## 2025-07-24 DIAGNOSIS — I34.0 NONRHEUMATIC MITRAL VALVE REGURGITATION: ICD-10-CM

## 2025-07-24 PROCEDURE — 93306 TTE W/DOPPLER COMPLETE: CPT

## 2025-07-25 ENCOUNTER — RESULTS FOLLOW-UP (OUTPATIENT)
Dept: CARDIOLOGY CLINIC | Age: 50
End: 2025-07-25

## 2025-07-25 LAB
ECHO AO ASC DIAM: 3.1 CM
ECHO AO ASCENDING AORTA INDEX: 1.42 CM/M2
ECHO AO ROOT DIAM: 3 CM
ECHO AO ROOT INDEX: 1.38 CM/M2
ECHO AV CUSP MM: 1.9 CM
ECHO AV MEAN GRADIENT: 6 MMHG
ECHO AV MEAN VELOCITY: 1.1 M/S
ECHO AV PEAK GRADIENT: 10 MMHG
ECHO AV PEAK VELOCITY: 1.6 M/S
ECHO AV VELOCITY RATIO: 0.69
ECHO AV VTI: 28.6 CM
ECHO BSA: 2.31 M2
ECHO EST RA PRESSURE: 3 MMHG
ECHO LA AREA 2C: 21.2 CM2
ECHO LA AREA 4C: 22.1 CM2
ECHO LA DIAMETER INDEX: 1.88 CM/M2
ECHO LA DIAMETER: 4.1 CM
ECHO LA MAJOR AXIS: 5.8 CM
ECHO LA MINOR AXIS: 5.5 CM
ECHO LA TO AORTIC ROOT RATIO: 1.37
ECHO LA VOL BP: 67 ML (ref 22–52)
ECHO LA VOL MOD A2C: 67 ML (ref 22–52)
ECHO LA VOL MOD A4C: 63 ML (ref 22–52)
ECHO LA VOL/BSA BIPLANE: 31 ML/M2 (ref 16–34)
ECHO LA VOLUME INDEX MOD A2C: 31 ML/M2 (ref 16–34)
ECHO LA VOLUME INDEX MOD A4C: 29 ML/M2 (ref 16–34)
ECHO LV E' LATERAL VELOCITY: 13.2 CM/S
ECHO LV E' SEPTAL VELOCITY: 10.3 CM/S
ECHO LV EDV 3D: 195 ML
ECHO LV EDV INDEX 3D: 89 ML/M2
ECHO LV EF PHYSICIAN: 60 %
ECHO LV EJECTION FRACTION 3D: 60 %
ECHO LV ESV 3D: 78 ML
ECHO LV ESV INDEX 3D: 36 ML/M2
ECHO LV FRACTIONAL SHORTENING: 33 % (ref 28–44)
ECHO LV GLOBAL LONGITUDINAL STRAIN (GLS): -15.9 %
ECHO LV GLOBAL LONGITUDINAL STRAIN (GLS): -19.5 %
ECHO LV GLOBAL LONGITUDINAL STRAIN (GLS): -19.8 %
ECHO LV GLOBAL LONGITUDINAL STRAIN (GLS): -22.8 %
ECHO LV INTERNAL DIMENSION DIASTOLE INDEX: 2.11 CM/M2
ECHO LV INTERNAL DIMENSION DIASTOLIC: 4.6 CM (ref 3.9–5.3)
ECHO LV INTERNAL DIMENSION SYSTOLIC INDEX: 1.42 CM/M2
ECHO LV INTERNAL DIMENSION SYSTOLIC: 3.1 CM
ECHO LV ISOVOLUMETRIC RELAXATION TIME (IVRT): 74 MS
ECHO LV IVSD: 1 CM (ref 0.6–0.9)
ECHO LV MASS 2D: 192.9 G (ref 67–162)
ECHO LV MASS 3D INDEX: 115.1 G/M2
ECHO LV MASS 3D: 251 G
ECHO LV MASS INDEX 2D: 88.5 G/M2 (ref 43–95)
ECHO LV POSTERIOR WALL DIASTOLIC: 1.3 CM (ref 0.6–0.9)
ECHO LV RELATIVE WALL THICKNESS RATIO: 0.57
ECHO LVOT AV VTI INDEX: 0.8
ECHO LVOT MEAN GRADIENT: 3 MMHG
ECHO LVOT PEAK GRADIENT: 5 MMHG
ECHO LVOT PEAK VELOCITY: 1.1 M/S
ECHO LVOT VTI: 23 CM
ECHO MV A VELOCITY: 0.63 M/S
ECHO MV E DECELERATION TIME (DT): 193 MS
ECHO MV E VELOCITY: 0.76 M/S
ECHO MV E/A RATIO: 1.21
ECHO MV E/E' LATERAL: 5.76
ECHO MV E/E' RATIO (AVERAGED): 6.57
ECHO MV E/E' SEPTAL: 7.38
ECHO RA AREA 4C: 14.7 CM2
ECHO RA END SYSTOLIC VOLUME APICAL 4 CHAMBER INDEX BSA: 17 ML/M2
ECHO RA VOLUME: 36 ML
ECHO RV FREE WALL PEAK S': 13.8 CM/S

## 2025-07-28 RX ORDER — DULOXETIN HYDROCHLORIDE 30 MG/1
30 CAPSULE, DELAYED RELEASE ORAL 2 TIMES DAILY
Qty: 60 CAPSULE | Refills: 3 | Status: SHIPPED | OUTPATIENT
Start: 2025-07-28 | End: 2025-07-28 | Stop reason: SDUPTHER

## 2025-07-28 RX ORDER — DULOXETIN HYDROCHLORIDE 30 MG/1
30 CAPSULE, DELAYED RELEASE ORAL 2 TIMES DAILY
Qty: 60 CAPSULE | Refills: 3 | Status: SHIPPED | OUTPATIENT
Start: 2025-07-28

## 2025-07-28 NOTE — TELEPHONE ENCOUNTER
Last Office Visit  -  07/08/2025  Next Office Visit  -  08/08/2025    Last Filled  -  0728/2025  Last UDS -    Contract -

## 2025-07-30 ENCOUNTER — PATIENT MESSAGE (OUTPATIENT)
Dept: FAMILY MEDICINE CLINIC | Age: 50
End: 2025-07-30

## 2025-08-04 ENCOUNTER — OFFICE VISIT (OUTPATIENT)
Dept: GYNECOLOGY | Age: 50
End: 2025-08-04
Payer: COMMERCIAL

## 2025-08-04 VITALS
OXYGEN SATURATION: 100 % | RESPIRATION RATE: 17 BRPM | HEIGHT: 64 IN | SYSTOLIC BLOOD PRESSURE: 150 MMHG | DIASTOLIC BLOOD PRESSURE: 80 MMHG | HEART RATE: 94 BPM | WEIGHT: 264 LBS | BODY MASS INDEX: 45.07 KG/M2

## 2025-08-04 DIAGNOSIS — Z01.419 WELL WOMAN EXAM WITH ROUTINE GYNECOLOGICAL EXAM: Primary | ICD-10-CM

## 2025-08-04 DIAGNOSIS — R68.82 DECREASED LIBIDO: ICD-10-CM

## 2025-08-04 PROCEDURE — 99396 PREV VISIT EST AGE 40-64: CPT | Performed by: OBSTETRICS & GYNECOLOGY

## 2025-08-04 ASSESSMENT — ENCOUNTER SYMPTOMS
RESPIRATORY NEGATIVE: 1
ALLERGIC/IMMUNOLOGIC NEGATIVE: 1
GASTROINTESTINAL NEGATIVE: 1
EYES NEGATIVE: 1

## 2025-08-05 ENCOUNTER — TELEPHONE (OUTPATIENT)
Dept: ORTHOPEDIC SURGERY | Age: 50
End: 2025-08-05

## 2025-08-05 ENCOUNTER — OFFICE VISIT (OUTPATIENT)
Dept: ORTHOPEDIC SURGERY | Age: 50
End: 2025-08-05
Payer: COMMERCIAL

## 2025-08-05 VITALS — WEIGHT: 264 LBS | BODY MASS INDEX: 45.07 KG/M2 | HEIGHT: 64 IN

## 2025-08-05 DIAGNOSIS — R20.0 NUMBNESS AND TINGLING OF BOTH FEET: Primary | ICD-10-CM

## 2025-08-05 DIAGNOSIS — M54.2 NECK PAIN: ICD-10-CM

## 2025-08-05 DIAGNOSIS — R20.2 NUMBNESS AND TINGLING OF BOTH FEET: Primary | ICD-10-CM

## 2025-08-05 DIAGNOSIS — M51.26 PROTRUSION OF LUMBAR INTERVERTEBRAL DISC: ICD-10-CM

## 2025-08-05 DIAGNOSIS — G89.29 CHRONIC BILATERAL LOW BACK PAIN WITH LEFT-SIDED SCIATICA: ICD-10-CM

## 2025-08-05 DIAGNOSIS — M54.42 CHRONIC BILATERAL LOW BACK PAIN WITH LEFT-SIDED SCIATICA: ICD-10-CM

## 2025-08-05 PROCEDURE — 99213 OFFICE O/P EST LOW 20 MIN: CPT | Performed by: PHYSICIAN ASSISTANT

## 2025-08-14 ENCOUNTER — OFFICE VISIT (OUTPATIENT)
Dept: ORTHOPEDIC SURGERY | Age: 50
End: 2025-08-14
Payer: COMMERCIAL

## 2025-08-14 VITALS — HEIGHT: 64 IN | BODY MASS INDEX: 45.24 KG/M2 | WEIGHT: 265 LBS

## 2025-08-14 DIAGNOSIS — M71.21 BAKER'S CYST OF KNEE, RIGHT: Primary | ICD-10-CM

## 2025-08-14 DIAGNOSIS — M25.561 RIGHT KNEE PAIN, UNSPECIFIED CHRONICITY: ICD-10-CM

## 2025-08-14 PROCEDURE — 99213 OFFICE O/P EST LOW 20 MIN: CPT | Performed by: PHYSICIAN ASSISTANT

## 2025-08-15 DIAGNOSIS — I10 HYPERTENSION, UNSPECIFIED TYPE: ICD-10-CM

## 2025-08-15 RX ORDER — LISINOPRIL 40 MG/1
40 TABLET ORAL DAILY
Qty: 30 TABLET | Refills: 5 | Status: SHIPPED | OUTPATIENT
Start: 2025-08-15

## 2025-08-20 ENCOUNTER — OFFICE VISIT (OUTPATIENT)
Dept: NEUROLOGY | Age: 50
End: 2025-08-20
Payer: COMMERCIAL

## 2025-08-20 VITALS
DIASTOLIC BLOOD PRESSURE: 82 MMHG | HEIGHT: 64 IN | SYSTOLIC BLOOD PRESSURE: 126 MMHG | OXYGEN SATURATION: 97 % | WEIGHT: 265 LBS | BODY MASS INDEX: 45.24 KG/M2 | HEART RATE: 94 BPM

## 2025-08-20 DIAGNOSIS — M54.81 BILATERAL OCCIPITAL NEURALGIA: Primary | ICD-10-CM

## 2025-08-20 PROCEDURE — 99214 OFFICE O/P EST MOD 30 MIN: CPT | Performed by: STUDENT IN AN ORGANIZED HEALTH CARE EDUCATION/TRAINING PROGRAM

## 2025-08-20 PROCEDURE — 96372 THER/PROPH/DIAG INJ SC/IM: CPT | Performed by: STUDENT IN AN ORGANIZED HEALTH CARE EDUCATION/TRAINING PROGRAM

## 2025-08-20 PROCEDURE — 64405 NJX AA&/STRD GR OCPL NRV: CPT | Performed by: STUDENT IN AN ORGANIZED HEALTH CARE EDUCATION/TRAINING PROGRAM

## 2025-08-20 RX ORDER — LIDOCAINE HYDROCHLORIDE 20 MG/ML
5 INJECTION, SOLUTION INFILTRATION; PERINEURAL ONCE
Status: COMPLETED | OUTPATIENT
Start: 2025-08-20 | End: 2025-08-20

## 2025-08-20 RX ADMIN — LIDOCAINE HYDROCHLORIDE 5 ML: 20 INJECTION, SOLUTION INFILTRATION; PERINEURAL at 16:00

## (undated) DEVICE — ENDOSCOPIC KIT 2 12 FT OP4 DE2 GWN SYR

## (undated) DEVICE — CONMED SCOPE SAVER BITE BLOCK, 20X27 MM: Brand: SCOPE SAVER

## (undated) DEVICE — ELECTRODE ECG MONITR FOAM TEAR DROP ADLT RED

## (undated) DEVICE — FORCEPS BX L240CM JAW DIA2.8MM L CAP W/ NDL MIC MESH TOOTH